# Patient Record
Sex: FEMALE | Race: WHITE | Employment: FULL TIME | URBAN - METROPOLITAN AREA
[De-identification: names, ages, dates, MRNs, and addresses within clinical notes are randomized per-mention and may not be internally consistent; named-entity substitution may affect disease eponyms.]

---

## 2017-03-22 ENCOUNTER — OFFICE VISIT (OUTPATIENT)
Dept: FAMILY MEDICINE CLINIC | Age: 37
End: 2017-03-22
Payer: COMMERCIAL

## 2017-03-22 VITALS
TEMPERATURE: 98.4 F | SYSTOLIC BLOOD PRESSURE: 143 MMHG | BODY MASS INDEX: 31.58 KG/M2 | OXYGEN SATURATION: 99 % | RESPIRATION RATE: 17 BRPM | DIASTOLIC BLOOD PRESSURE: 97 MMHG | HEART RATE: 90 BPM | WEIGHT: 185 LBS | HEIGHT: 64 IN

## 2017-03-22 DIAGNOSIS — R07.89 CHEST WALL PAIN: ICD-10-CM
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
## 2017-03-22 DIAGNOSIS — M25.512 ACUTE PAIN OF LEFT SHOULDER: ICD-10-CM

## 2017-03-22 PROCEDURE — 99203 OFFICE O/P NEW LOW 30 MIN: CPT | Performed by: NURSE PRACTITIONER

## 2017-03-22 ASSESSMENT — ENCOUNTER SYMPTOMS
CHEST TIGHTNESS: 0
COUGH: 0
DIARRHEA: 0
NAUSEA: 0
ABDOMINAL PAIN: 0
BLOOD IN STOOL: 0
VOMITING: 0
SHORTNESS OF BREATH: 0

## 2017-10-16 ENCOUNTER — OFFICE VISIT (OUTPATIENT)
Dept: FAMILY MEDICINE CLINIC | Age: 37
End: 2017-10-16
Payer: COMMERCIAL

## 2017-10-16 VITALS
RESPIRATION RATE: 16 BRPM | BODY MASS INDEX: 31.41 KG/M2 | DIASTOLIC BLOOD PRESSURE: 73 MMHG | TEMPERATURE: 98.2 F | HEART RATE: 95 BPM | SYSTOLIC BLOOD PRESSURE: 120 MMHG | OXYGEN SATURATION: 98 % | HEIGHT: 64 IN | WEIGHT: 184 LBS

## 2017-10-16 DIAGNOSIS — J01.90 ACUTE BACTERIAL SINUSITIS: Primary | ICD-10-CM

## 2017-10-16 DIAGNOSIS — B96.89 ACUTE BACTERIAL SINUSITIS: Primary | ICD-10-CM

## 2017-10-16 PROCEDURE — 99214 OFFICE O/P EST MOD 30 MIN: CPT | Performed by: NURSE PRACTITIONER

## 2017-10-16 RX ORDER — DOXYCYCLINE HYCLATE 100 MG
100 TABLET ORAL 2 TIMES DAILY
Qty: 20 TABLET | Refills: 0 | Status: SHIPPED | OUTPATIENT
Start: 2017-10-16 | End: 2017-10-26

## 2017-10-16 ASSESSMENT — ENCOUNTER SYMPTOMS
SORE THROAT: 0
COUGH: 1
CHEST TIGHTNESS: 0
EYE DISCHARGE: 0
RHINORRHEA: 1
SHORTNESS OF BREATH: 1
VOICE CHANGE: 0
SINUS PRESSURE: 1
WHEEZING: 1
EYE REDNESS: 0

## 2017-10-16 NOTE — PROGRESS NOTES
[address] [facility] [address]  Dept: [phone]  Dept Fax: [phone]    Sintia Shirley is a 44 y.o. female who presents to the urgent care today for her medical conditions/complaints as noted below. Sintia Shirley is c/o of Chest Congestion (cough x 2 weeks)    HPI:     Cough   This is a new problem. Episode onset: 2 weeks ago. The problem has been unchanged. The cough is productive of sputum. Associated symptoms include nasal congestion, rhinorrhea, shortness of breath (if talking too much) and wheezing. Pertinent negatives include no chest pain, chills, ear congestion, ear pain, eye redness, fever, headaches, myalgias, postnasal drip, rash or sore throat. Risk factors for lung disease include smoking/tobacco exposure (occasionally). Treatments tried: dayquil, nyquil, mucinex. The treatment provided no relief. There is no history of asthma or COPD. History reviewed. No pertinent past medical history. Current Outpatient Prescriptions   Medication Sig Dispense Refill    Norgestimate-Eth Estradiol (MONONESSA PO) Take by mouth daily      doxycycline hyclate (VIBRA-TABS) 100 MG tablet Take 1 tablet by mouth 2 times daily for 10 days 20 tablet 0     No current facility-administered medications for this visit. Allergies   Allergen Reactions    Penicillins Rash     Reviewed PMH, SH, and FH with the patient and updated. Subjective:      Review of Systems   Constitutional: Negative for chills, fatigue and fever. HENT: Positive for congestion, rhinorrhea and sinus pressure. Negative for ear discharge, ear pain, postnasal drip, sneezing, sore throat and voice change. Eyes: Negative for discharge and redness. Respiratory: Positive for cough, shortness of breath (if talking too much) and wheezing. Negative for chest tightness. Cardiovascular: Negative. Negative for chest pain.    Musculoskeletal: Negative for Dispense:  20 tablet     Refill:  0       Patient given educational materials - see patient instructions. Discussed use, benefit, and side effects of prescribed medications. All patient questions answered. Pt voiced understanding.     Electronically signed by Kendra Brown NP on 10/16/2017 at 12:17 PM

## 2017-10-16 NOTE — PATIENT INSTRUCTIONS
hot, wet towel or a warm gel pack on your face 3 or 4 times a day for 5 to 10 minutes each time. · Try a decongestant nasal spray like oxymetazoline (Afrin). Do not use it for more than 3 days in a row. Using it for more than 3 days can make your congestion worse. When should you call for help? Call your doctor now or seek immediate medical care if:  · You have new or worse swelling or redness in your face or around your eyes. · You have a new or higher fever. Watch closely for changes in your health, and be sure to contact your doctor if:  · You have new or worse facial pain. · The mucus from your nose becomes thicker (like pus) or has new blood in it. · You are not getting better as expected. Where can you learn more? Go to https://C$ cMoneypemosheMedCity Newseb.[employer]. org and sign in to your QuickoLabs account. Enter W568 in the Inversiones.com box to learn more about \"Sinusitis: Care Instructions. \"     If you do not have an account, please click on the \"Sign Up Now\" link. Current as of: July 29, 2016  Content Version: 11.3  © 0002-6687 [employer]oh, Skopeo.fr. Care instructions adapted under license by [facility] ([facility]). If you have questions about a medical condition or this instruction, always ask your healthcare professional. [address]  any warranty or liability for your use of this information.

## 2017-11-08 ENCOUNTER — OFFICE VISIT (OUTPATIENT)
Dept: FAMILY MEDICINE CLINIC | Age: 37
End: 2017-11-08
Payer: COMMERCIAL

## 2017-11-08 VITALS
TEMPERATURE: 97.7 F | SYSTOLIC BLOOD PRESSURE: 112 MMHG | HEIGHT: 64 IN | OXYGEN SATURATION: 98 % | DIASTOLIC BLOOD PRESSURE: 78 MMHG | WEIGHT: 191 LBS | BODY MASS INDEX: 32.61 KG/M2 | HEART RATE: 81 BPM

## 2017-11-08 DIAGNOSIS — J01.90 ACUTE BACTERIAL SINUSITIS: Primary | ICD-10-CM

## 2017-11-08 DIAGNOSIS — B96.89 ACUTE BACTERIAL SINUSITIS: Primary | ICD-10-CM

## 2017-11-08 PROCEDURE — 99214 OFFICE O/P EST MOD 30 MIN: CPT | Performed by: NURSE PRACTITIONER

## 2017-11-08 RX ORDER — CLINDAMYCIN HYDROCHLORIDE 300 MG/1
300 CAPSULE ORAL 3 TIMES DAILY
Qty: 30 CAPSULE | Refills: 0 | Status: SHIPPED | OUTPATIENT
Start: 2017-11-08 | End: 2017-11-18

## 2017-11-08 RX ORDER — FLUTICASONE PROPIONATE 50 MCG
2 SPRAY, SUSPENSION (ML) NASAL DAILY
Qty: 1 BOTTLE | Refills: 0 | Status: SHIPPED | OUTPATIENT
Start: 2017-11-08 | End: 2018-01-02 | Stop reason: SDUPTHER

## 2017-11-08 ASSESSMENT — ENCOUNTER SYMPTOMS
WHEEZING: 0
SHORTNESS OF BREATH: 1
CHEST TIGHTNESS: 0
VOICE CHANGE: 0
SORE THROAT: 1
RHINORRHEA: 1
EYE DISCHARGE: 0
EYE REDNESS: 0
SINUS PRESSURE: 1
COUGH: 1

## 2017-11-08 NOTE — PATIENT INSTRUCTIONS

## 2017-11-08 NOTE — PROGRESS NOTES
[facility]  [address] [place] [address]  Dept: [phone]  Dept Fax: [phone]    Shaka Beard is a 44 y.o. female who presents to the urgent care today for her medical conditions/complaints as noted below. Shaka Beard is c/o of Facial Pain (pressure, cough, sore throat, feels like she has a pill stuck in throat  )    HPI:     Sinusitis   This is a new problem. Episode onset: Sunday. The problem has been gradually worsening since onset. There has been no fever. Associated symptoms include congestion, coughing (productive purulent), ear pain (congestion), shortness of breath (at times), sinus pressure and a sore throat (scatchy). Pertinent negatives include no chills, headaches or sneezing. Treatments tried: dayquil, nyquil, theraflu. The treatment provided no relief. Was on Augmentin about 3 weeks ago and got better with the treatment. Cough never got better though. Symptoms are worse over the last 3-4 days. History reviewed. No pertinent past medical history. Current Outpatient Prescriptions   Medication Sig Dispense Refill    metFORMIN (GLUCOPHAGE) 500 MG tablet Take 500 mg by mouth daily      clindamycin (CLEOCIN) 300 MG capsule Take 1 capsule by mouth 3 times daily for 10 days 30 capsule 0    fluticasone (FLONASE) 50 MCG/ACT nasal spray 2 sprays by Nasal route daily 1 Bottle 0    Norgestimate-Eth Estradiol (MONONESSA PO) Take by mouth daily       No current facility-administered medications for this visit. Allergies   Allergen Reactions    Penicillins Rash     Reviewed PMH, SH, and FH with the patient and updated. Subjective:      Review of Systems   Constitutional: Negative for chills, fatigue and fever. HENT: Positive for congestion, ear pain (congestion), postnasal drip, rhinorrhea, sinus pressure and sore throat (scatchy). Negative for ear discharge, sneezing and voice change.     Eyes: Negative for discharge and redness. Respiratory: Positive for cough (productive purulent) and shortness of breath (at times). Negative for chest tightness and wheezing. Cardiovascular: Negative. Negative for chest pain. Musculoskeletal: Negative for myalgias. Skin: Negative for rash. Neurological: Negative for dizziness, weakness, light-headedness and headaches. Hematological: Negative for adenopathy. All other systems reviewed and are negative. Objective:     Physical Exam   Constitutional: She appears well-developed and well-nourished. HENT:   Head: Normocephalic. Right Ear: Tympanic membrane and external ear normal.   Left Ear: Tympanic membrane and external ear normal.   Nose: Rhinorrhea present. Right sinus exhibits maxillary sinus tenderness. Right sinus exhibits no frontal sinus tenderness. Left sinus exhibits maxillary sinus tenderness. Left sinus exhibits no frontal sinus tenderness. Mouth/Throat: Oropharyngeal exudate (PND) and posterior oropharyngeal erythema (slight) present. Eyes: Right eye exhibits no discharge. Left eye exhibits no discharge. Cardiovascular: Normal rate, regular rhythm and normal heart sounds. No murmur heard. Pulmonary/Chest: Effort normal and breath sounds normal. No respiratory distress. She has no wheezes. Lymphadenopathy:     She has cervical adenopathy. Skin: Skin is warm. No rash noted. She is not diaphoretic. Nursing note and vitals reviewed. /78 (Site: Left Arm, Position: Sitting, Cuff Size: Medium Adult)   Pulse 81   Temp 97.7 °F (36.5 °C) (Oral)   Ht 5' 3.5\" (1.613 m)   Wt 191 lb (86.6 kg)   LMP 11/01/2017 (Exact Date)   SpO2 98%   BMI 33.30 kg/m²     Assessment:      1. Acute bacterial sinusitis  clindamycin (CLEOCIN) 300 MG capsule    fluticasone (FLONASE) 50 MCG/ACT nasal spray     Plan:      Based on the severity of the symptoms-- I will treat this as bacterial at this time with Clindamycin x 10 days.   Patient instructed to complete antibiotic prescription fully. Flonase daily. May use Motrin/Tylenol for fever/pain. Saline washes, salt water gargles and over the counter preparations if desired. Follow up if symptoms do not improve. Orders Placed This Encounter   Medications    clindamycin (CLEOCIN) 300 MG capsule     Sig: Take 1 capsule by mouth 3 times daily for 10 days     Dispense:  30 capsule     Refill:  0    fluticasone (FLONASE) 50 MCG/ACT nasal spray     Si sprays by Nasal route daily     Dispense:  1 Bottle     Refill:  0       Patient given educational materials - see patient instructions. Discussed use, benefit, and side effects of prescribed medications. All patient questions answered. Pt voiced understanding.     Electronically signed by Zheng Harrison NP on 2017 at 5:52 PM

## 2018-01-02 DIAGNOSIS — J01.90 ACUTE BACTERIAL SINUSITIS: ICD-10-CM

## 2018-01-02 DIAGNOSIS — B96.89 ACUTE BACTERIAL SINUSITIS: ICD-10-CM

## 2018-01-02 RX ORDER — FLUTICASONE PROPIONATE 50 MCG
2 SPRAY, SUSPENSION (ML) NASAL DAILY
Qty: 1 BOTTLE | Refills: 0 | Status: SHIPPED | OUTPATIENT
Start: 2018-01-02 | End: 2019-09-20

## 2018-03-29 ENCOUNTER — HOSPITAL ENCOUNTER (EMERGENCY)
Age: 38
Discharge: HOME | End: 2018-03-30
Attending: EMERGENCY MEDICINE
Payer: COMMERCIAL

## 2018-03-29 ENCOUNTER — APPOINTMENT (OUTPATIENT)
Dept: GENERAL RADIOLOGY | Age: 38
End: 2018-03-29
Payer: COMMERCIAL

## 2018-03-29 VITALS
WEIGHT: 190 LBS | BODY MASS INDEX: 32.44 KG/M2 | RESPIRATION RATE: 18 BRPM | TEMPERATURE: 97.7 F | HEART RATE: 101 BPM | SYSTOLIC BLOOD PRESSURE: 137 MMHG | OXYGEN SATURATION: 98 % | DIASTOLIC BLOOD PRESSURE: 84 MMHG | HEIGHT: 64 IN

## 2018-03-29 DIAGNOSIS — R07.89 CHEST WALL PAIN: Primary | ICD-10-CM

## 2018-03-29 LAB
CHP ED QC CHECK: NORMAL
EKG ATRIAL RATE: 93 BPM
EKG P AXIS: 59 DEGREES
EKG P-R INTERVAL: 158 MS
EKG Q-T INTERVAL: 442 MS
EKG QRS DURATION: 86 MS
EKG QTC CALCULATION (BAZETT): 549 MS
EKG R AXIS: 22 DEGREES
EKG T AXIS: 36 DEGREES
EKG VENTRICULAR RATE: 93 BPM
PREGNANCY TEST URINE, POC: NEGATIVE

## 2018-03-29 PROCEDURE — 71046 X-RAY EXAM CHEST 2 VIEWS: CPT

## 2018-03-29 PROCEDURE — 93005 ELECTROCARDIOGRAM TRACING: CPT

## 2018-03-29 PROCEDURE — 84703 CHORIONIC GONADOTROPIN ASSAY: CPT

## 2018-03-29 PROCEDURE — 99285 EMERGENCY DEPT VISIT HI MDM: CPT

## 2018-03-29 RX ORDER — METAXALONE 800 MG/1
800 TABLET ORAL ONCE
Status: COMPLETED | OUTPATIENT
Start: 2018-03-29 | End: 2018-03-30

## 2018-03-29 RX ORDER — HYDROCODONE BITARTRATE AND ACETAMINOPHEN 5; 325 MG/1; MG/1
2 TABLET ORAL ONCE
Status: COMPLETED | OUTPATIENT
Start: 2018-03-29 | End: 2018-03-30

## 2018-03-29 ASSESSMENT — PAIN DESCRIPTION - PROGRESSION: CLINICAL_PROGRESSION: GRADUALLY WORSENING

## 2018-03-29 ASSESSMENT — PAIN DESCRIPTION - FREQUENCY: FREQUENCY: CONTINUOUS

## 2018-03-29 ASSESSMENT — PAIN DESCRIPTION - ORIENTATION: ORIENTATION: MID

## 2018-03-29 ASSESSMENT — PAIN DESCRIPTION - DESCRIPTORS: DESCRIPTORS: DISCOMFORT

## 2018-03-29 ASSESSMENT — PAIN DESCRIPTION - PAIN TYPE: TYPE: ACUTE PAIN

## 2018-03-29 ASSESSMENT — PAIN DESCRIPTION - LOCATION: LOCATION: CHEST

## 2018-03-29 ASSESSMENT — PAIN SCALES - GENERAL: PAINLEVEL_OUTOF10: 10

## 2018-03-30 LAB — HCG, PREGNANCY URINE (POC): NEGATIVE

## 2018-03-30 PROCEDURE — 6370000000 HC RX 637 (ALT 250 FOR IP): Performed by: NURSE PRACTITIONER

## 2018-03-30 RX ORDER — METHOCARBAMOL 750 MG/1
750 TABLET, FILM COATED ORAL 3 TIMES DAILY PRN
Qty: 15 TABLET | Refills: 0 | Status: SHIPPED | OUTPATIENT
Start: 2018-03-30 | End: 2018-04-04

## 2018-03-30 RX ORDER — IBUPROFEN 800 MG/1
800 TABLET ORAL EVERY 8 HOURS PRN
Qty: 20 TABLET | Refills: 0 | Status: SHIPPED | OUTPATIENT
Start: 2018-03-30 | End: 2019-09-20

## 2018-03-30 RX ADMIN — METAXALONE 800 MG: 800 TABLET ORAL at 00:28

## 2018-03-30 RX ADMIN — HYDROCODONE BITARTRATE AND ACETAMINOPHEN 2 TABLET: 5; 325 TABLET ORAL at 00:28

## 2018-03-30 NOTE — ED PROVIDER NOTES
[address] [place] [address] ED  eMERGENCY dEPARTMENT eNCOUnter      Pt Name: Helio Desai  MRN: [id number]  [place] 1980  Date of evaluation: 3/29/2018  Provider: Kerry Broussard NP    [address] [place] [address]       Chief Complaint   Patient presents with    Chest Pain     mid sternum         HISTORY OF PRESENT ILLNESS  (Location/Symptom, Timing/Onset, Context/Setting, Quality, Duration, Modifying Factors, Severity.)   Helio Desai is a 44 y.o. female who presents to the emergency department By private auto for evaluation of midsternal chest pain that occurred on patient slipped and went to fall backwards and she put her arms out to brace her fall and felt a pull in the middle of her chest.  She denies other injury. Pain is worse with movement. Pain is a 10 out of 10. Nursing Notes were reviewed. PAST MEDICAL HISTORY     Past Medical History:   Diagnosis Date    Diabetes mellitus ([facility] [address]     type 2         SURGICAL HISTORY     History reviewed. No pertinent surgical history.       CURRENT MEDICATIONS       Previous Medications    FLUTICASONE (FLONASE) 50 MCG/ACT NASAL SPRAY    2 sprays by Nasal route daily    METFORMIN (GLUCOPHAGE) 500 MG TABLET    Take 500 mg by mouth daily    NORGESTIMATE-ETH ESTRADIOL (MONONESSA PO)    Take by mouth daily       ALLERGIES     Penicillins    FAMILY HISTORY       Family History   Problem Relation Age of Onset    Heart Disease Mother     Other Mother      blood clotting disorder    Heart Disease Father     Other Sister      ASD          SOCIAL HISTORY       Social History     Social History    Marital status:      Spouse name: N/A    Number of children: N/A    Years of education: N/A     Social History Main Topics    Smoking status: Current Every Day Smoker     Packs/day: 0.50     Types: Cigarettes    Smokeless tobacco: Former User      Comment: 4 per day    Alcohol use No      Comment: socially    Drug use: No    Sexual activity: Not Currently     Other Topics Concern   

## 2018-03-30 NOTE — ED NOTES
Pt presents to ED via private auto with reports of mid sternum chest discomfort 10/10. Pt states she slipped and heard a \"pop\" in her chest. Pt denies taking anything for the discomfort prior to arrival. Pt denies any cardiac hx. Respirations even, non labored. Skin warm, dry, intact. Pt states that the discomfort is worse with breathing. Pt oxygen saturation 98 % on room air. Family at bedside. Call light within reach. Will continue to monitor for improvement.       Prosper Thorpe RN  03/29/18 1219

## 2019-09-20 ENCOUNTER — OFFICE VISIT (OUTPATIENT)
Dept: FAMILY MEDICINE CLINIC | Age: 39
End: 2019-09-20
Payer: COMMERCIAL

## 2019-09-20 VITALS
BODY MASS INDEX: 32.27 KG/M2 | SYSTOLIC BLOOD PRESSURE: 131 MMHG | HEIGHT: 64 IN | TEMPERATURE: 98.4 F | DIASTOLIC BLOOD PRESSURE: 82 MMHG | WEIGHT: 189 LBS | HEART RATE: 77 BPM | OXYGEN SATURATION: 97 %

## 2019-09-20 DIAGNOSIS — M79.10 MUSCLE PAIN: ICD-10-CM

## 2019-09-20 DIAGNOSIS — M54.9 UPPER BACK PAIN: Primary | ICD-10-CM

## 2019-09-20 PROCEDURE — 99213 OFFICE O/P EST LOW 20 MIN: CPT | Performed by: NURSE PRACTITIONER

## 2019-09-20 PROCEDURE — 96372 THER/PROPH/DIAG INJ SC/IM: CPT | Performed by: NURSE PRACTITIONER

## 2019-09-20 RX ORDER — CYCLOBENZAPRINE HCL 5 MG
5 TABLET ORAL 2 TIMES DAILY PRN
Qty: 10 TABLET | Refills: 0 | Status: SHIPPED | OUTPATIENT
Start: 2019-09-20 | End: 2019-09-30

## 2019-09-20 RX ORDER — KETOROLAC TROMETHAMINE 30 MG/ML
30 INJECTION, SOLUTION INTRAMUSCULAR; INTRAVENOUS ONCE
Status: COMPLETED | OUTPATIENT
Start: 2019-09-20 | End: 2019-09-20

## 2019-09-20 RX ORDER — TRIAMCINOLONE ACETONIDE 40 MG/ML
40 INJECTION, SUSPENSION INTRA-ARTICULAR; INTRAMUSCULAR ONCE
Status: COMPLETED | OUTPATIENT
Start: 2019-09-20 | End: 2019-09-20

## 2019-09-20 RX ORDER — NORGESTIMATE AND ETHINYL ESTRADIOL 0.25-0.035
KIT ORAL
Refills: 12 | COMMUNITY
Start: 2019-09-06 | End: 2021-06-17

## 2019-09-20 RX ADMIN — KETOROLAC TROMETHAMINE 30 MG: 30 INJECTION, SOLUTION INTRAMUSCULAR; INTRAVENOUS at 15:35

## 2019-09-20 RX ADMIN — TRIAMCINOLONE ACETONIDE 40 MG: 40 INJECTION, SUSPENSION INTRA-ARTICULAR; INTRAMUSCULAR at 15:35

## 2019-09-20 ASSESSMENT — ENCOUNTER SYMPTOMS
NAUSEA: 0
COUGH: 0
COLOR CHANGE: 0
RHINORRHEA: 0
SHORTNESS OF BREATH: 0
CHEST TIGHTNESS: 0
SORE THROAT: 0
VOMITING: 0
BACK PAIN: 1

## 2019-09-20 NOTE — PATIENT INSTRUCTIONS
and exercise. · Take pain medicines exactly as directed. ? If the doctor gave you a prescription medicine for pain, take it as prescribed. ? If you are not taking a prescription pain medicine, ask your doctor if you can take an over-the-counter medicine. When should you call for help? Call 911 anytime you think you may need emergency care. For example, call if:    · You are unable to move a leg at all.     · You have back pain with severe belly pain.     · You have symptoms of a heart attack. These may include:  ? Chest pain or pressure, or a strange feeling in the chest.  ? Sweating. ? Shortness of breath. ? Nausea or vomiting. ? Pain, pressure, or a strange feeling in the back, neck, jaw, or upper belly or in one or both shoulders or arms. ? Lightheadedness or sudden weakness. ? A fast or irregular heartbeat. After you call 911, the  may tell you to chew 1 adult-strength or 2 to 4 low-dose aspirin. Wait for an ambulance. Do not try to drive yourself.    Call your doctor now or seek immediate medical care if:    · You have new or worse symptoms in your arms, legs, chest, belly, or buttocks. Symptoms may include:  ? Numbness or tingling. ? Weakness. ? Pain.     · You lose bladder or bowel control.     · You have back pain and:  ? You have injured your back while lifting or doing some other activity. Call if the pain is severe, has not gone away after 1 or 2 days, and you cannot do your normal daily activities. ? You have had a back injury before that needed treatment. ? Your pain has lasted longer than 4 weeks. ? You have had weight loss you cannot explain. ? You have a fever. ? You are age 48 or older. ? You have cancer now or have had it before.    Watch closely for changes in your health, and be sure to contact your doctor if you are not getting better as expected. Where can you learn more? Go to https://nhan.NovaSparks. org and sign in to your MobileOCT account.  Enter T181 in the [facility] box to learn more about \"Back Pain, Emergency or Urgent Symptoms: Care Instructions. \"     If you do not have an account, please click on the \"Sign Up Now\" link. Current as of: September 23, 2018  Content Version: 12.1  © 8450-6780 Healthwise, Incorporated. Care instructions adapted under license by [facility] ([facility]). If you have questions about a medical condition or this instruction, always ask your healthcare professional. [address] any warranty or liability for your use of this information.

## 2020-07-16 ENCOUNTER — OFFICE VISIT (OUTPATIENT)
Dept: FAMILY MEDICINE CLINIC | Age: 40
End: 2020-07-16
Payer: COMMERCIAL

## 2020-07-16 VITALS
HEART RATE: 85 BPM | DIASTOLIC BLOOD PRESSURE: 76 MMHG | SYSTOLIC BLOOD PRESSURE: 115 MMHG | OXYGEN SATURATION: 97 % | TEMPERATURE: 98.1 F

## 2020-07-16 PROCEDURE — 99213 OFFICE O/P EST LOW 20 MIN: CPT | Performed by: PHYSICIAN ASSISTANT

## 2020-07-16 RX ORDER — SULFAMETHOXAZOLE AND TRIMETHOPRIM 800; 160 MG/1; MG/1
1 TABLET ORAL 2 TIMES DAILY
Qty: 14 TABLET | Refills: 0 | Status: SHIPPED | OUTPATIENT
Start: 2020-07-16 | End: 2020-07-23

## 2020-07-16 ASSESSMENT — ENCOUNTER SYMPTOMS
RESPIRATORY NEGATIVE: 1
BURN: 1
GASTROINTESTINAL NEGATIVE: 1

## 2020-07-16 ASSESSMENT — PATIENT HEALTH QUESTIONNAIRE - PHQ9
SUM OF ALL RESPONSES TO PHQ9 QUESTIONS 1 & 2: 0
SUM OF ALL RESPONSES TO PHQ QUESTIONS 1-9: 0
1. LITTLE INTEREST OR PLEASURE IN DOING THINGS: 0
2. FEELING DOWN, DEPRESSED OR HOPELESS: 0
SUM OF ALL RESPONSES TO PHQ QUESTIONS 1-9: 0
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
## 2020-07-16 NOTE — PATIENT INSTRUCTIONS
Patient Education        Faustin: Care Instructions  Your Care Instructions     Faustin--even minor ones--can be very painful. A minor burn may heal within several days, while a more serious burn may take weeks or even months to heal completely. You may notice that the burned area feels tight and hard while it is healing. It is important to continue to move the area as the burn heals to prevent loss of motion or loss of function in the area. When your skin is damaged by a burn, you have a greater risk of infection. Keep the wound clean and change the bandages regularly to prevent infection and help the burn heal.  Burns can leave permanent scars. Taking good care of the burn as it heals may help prevent bad scars. The doctor has checked you carefully, but problems can develop later. If you notice any problems or new symptoms, get medical treatment right away. Follow-up care is a key part of your treatment and safety. Be sure to make and go to all appointments, and call your doctor if you are having problems. It's also a good idea to know your test results and keep a list of the medicines you take. How can you care for yourself at home? · If your doctor told you how to care for your burn, follow your doctor's instructions. If you did not get instructions, follow this general advice:  ? Wash the burn with clean water 2 times a day. Don't use hydrogen peroxide or alcohol, which can slow healing. ? Gently pat the burn dry after you wash it.  ? You may cover the burn with a thin layer of petroleum jelly, such as Vaseline, and a nonstick bandage. ? Apply more petroleum jelly and replace the bandage as needed. · Protect your burn while it is healing. Cover your burn if you are going out in the cold or the sun. ? Wear long sleeves if the burn is on your hands or arms. ? Wear a hat if the burn is on your face. ? Wear socks and shoes if the burn is on your feet. · Do not break blisters open.  This increases the chance of infection. If a blister breaks open by itself, blot up the liquid, and leave the skin that covered the blister. This helps protect the new skin. · If your doctor prescribed antibiotics, take them as directed. Do not stop taking them just because you feel better. You need to take the full course of antibiotics. For pain and itching  · Take pain medicines exactly as directed. ? If the doctor gave you a prescription medicine for pain, take it as prescribed. ? If you are not taking a prescription pain medicine, ask your doctor if you can take an over-the-counter medicine. · If the burn itches, try not to scratch it. Try an over-the-counter antihistamine such as diphenhydramine (Benadryl) or loratadine (Claritin). Read and follow all instructions on the label. When should you call for help? Call your doctor now or seek immediate medical care if:  · Your pain gets worse. · You have symptoms of infection, such as:  ? Increased pain, swelling, warmth, or redness near the burn. ? Red streaks leading from the burn. ? Pus draining from the burn. ? A fever. Watch closely for changes in your health, and be sure to contact your doctor if:  · You do not get better as expected. Where can you learn more? Go to https://Green A.Mirifice. org and sign in to your InVenture account. Enter B790 in the [facility] box to learn more about \"Burns: Care Instructions. \"     If you do not have an account, please click on the \"Sign Up Now\" link. Current as of: June 26, 2019               Content Version: 12.5  © 5509-3952 Healthwise, Incorporated. Care instructions adapted under license by [facility] ([facility]). If you have questions about a medical condition or this instruction, always ask your healthcare professional. [place] [address] any warranty or liability for your use of this information.          Patient Education

## 2020-08-14 ENCOUNTER — OFFICE VISIT (OUTPATIENT)
Dept: FAMILY MEDICINE CLINIC | Age: 40
End: 2020-08-14
Payer: COMMERCIAL

## 2020-08-14 ENCOUNTER — HOSPITAL ENCOUNTER (OUTPATIENT)
Age: 40
Setting detail: SPECIMEN
Discharge: HOME | End: 2020-08-14
Payer: COMMERCIAL

## 2020-08-14 VITALS
RESPIRATION RATE: 16 BRPM | TEMPERATURE: 97.9 F | BODY MASS INDEX: 32.44 KG/M2 | HEIGHT: 64 IN | HEART RATE: 92 BPM | OXYGEN SATURATION: 97 % | WEIGHT: 190 LBS

## 2020-08-14 PROCEDURE — 99202 OFFICE O/P NEW SF 15 MIN: CPT | Performed by: NURSE PRACTITIONER

## 2020-08-14 RX ORDER — BENZONATATE 200 MG/1
200 CAPSULE ORAL 3 TIMES DAILY PRN
Qty: 30 CAPSULE | Refills: 0 | Status: SHIPPED | OUTPATIENT
Start: 2020-08-14 | End: 2020-08-21

## 2020-08-14 ASSESSMENT — PATIENT HEALTH QUESTIONNAIRE - PHQ9
SUM OF ALL RESPONSES TO PHQ QUESTIONS 1-9: 0
SUM OF ALL RESPONSES TO PHQ9 QUESTIONS 1 & 2: 0
SUM OF ALL RESPONSES TO PHQ QUESTIONS 1-9: 0
1. LITTLE INTEREST OR PLEASURE IN DOING THINGS: 0
2. FEELING DOWN, DEPRESSED OR HOPELESS: 0

## 2020-08-14 ASSESSMENT — ENCOUNTER SYMPTOMS
SORE THROAT: 1
SINUS PAIN: 0
DIARRHEA: 0
NAUSEA: 0
COUGH: 1
SHORTNESS OF BREATH: 0
ABDOMINAL PAIN: 0
VOMITING: 0

## 2020-08-14 NOTE — LETTER
[address] [place] [address] [address] [address]  Phone: [phone]  Fax: [phone]    CLARA Avila CNP        August 14, 2020     Patient: Justin Oro   YOB: 1980   Date of Visit: 8/14/2020       To Whom It May Concern: It is my medical opinion that Justin Oro is excused pending COVID results. If you have any questions or concerns, please don't hesitate to call.     Sincerely,        CLARA Avila CNP

## 2020-08-14 NOTE — PROGRESS NOTES
[facility]  [address]  [facility] [address]  Dept: [phone]  Dept Fax: [phone]    Chantell Yousif is a 35 y.o. female who presents to the urgent care today for her medicalconditions/complaints as noted below. Chantell Yousif is c/o of Cough; Congestion; and Nasal Congestion      HPI:     58-year-old female patient presents with complaint of cough, congestion, mild sore throat and postnasal drainage. Reports abrupt onset of symptoms yesterday. Reports cough is intermittent, mostly dry. Reports nasal congestion productive of yellow mucus. Reports mild scratchy sore throat likely caused by postnasal drainage per the patient. Denies sinus pain or pressure. Denies any ear pain. Denies fevers or chills. Denies chest pain or shortness of breath. Denies vomiting or diarrhea. Denies loss of taste or smell. Denies any COVID-19 exposure. .      Past Medical History:   Diagnosis Date    Diabetes mellitus (HCC)     type 2        Current Outpatient Medications   Medication Sig Dispense Refill    benzonatate (TESSALON) 200 MG capsule Take 1 capsule by mouth 3 times daily as needed for Cough 30 capsule 0    silver sulfADIAZINE (SILVADENE) 1 % cream Apply 2 time daily 25 g 1    ESTARYLLA 0.25-35 MG-MCG per tablet TK 1 T PO D  12    metFORMIN (GLUCOPHAGE) 500 MG tablet Take 500 mg by mouth daily       No current facility-administered medications for this visit. Allergies   Allergen Reactions    Penicillins Rash       Subjective:      Review of Systems   Constitutional: Negative for chills and fever. HENT: Positive for congestion and sore throat. Negative for ear pain and sinus pain. Respiratory: Positive for cough. Negative for shortness of breath. Cardiovascular: Negative for chest pain and palpitations. Gastrointestinal: Negative for abdominal pain, diarrhea, nausea and vomiting.    Neurological: Negative for dizziness and findings. Tylenol/Motrin for fever/discomfort. Patient agreeable to treatment plan. Educational materials provided on AVS.  Follow up if symptoms do not improve/worsen. Recommend isolation pending covid results. Discussed treatment regimen to include rest, hydration, tylenol prn. Discussed deep breathing exercises. Discussed to monitor for progression of symptoms. Follow up as needed. Will contact patient for results       Patient given educational materials - see patient instructions. Discussed use, benefit, and side effects of prescribed medications. All patientquestions answered. Pt voiced understanding. This note was transcribed using dictation with Dragon services. Efforts were made to correct any errors but some words may be misinterpreted.      Electronically signed by CLARA Strauss Che, CNP on 8/14/2020at 9:10 AM

## 2020-08-18 ENCOUNTER — TELEPHONE (OUTPATIENT)
Dept: FAMILY MEDICINE CLINIC | Age: 40
End: 2020-08-18

## 2020-08-18 LAB — SARS-COV-2, NAA: NOT DETECTED

## 2020-08-18 RX ORDER — AZITHROMYCIN 250 MG/1
250 TABLET, FILM COATED ORAL SEE ADMIN INSTRUCTIONS
Qty: 6 TABLET | Refills: 0 | Status: SHIPPED | OUTPATIENT
Start: 2020-08-18 | End: 2020-08-23

## 2020-08-18 NOTE — TELEPHONE ENCOUNTER
Patient called stating that she is not getting better and was told that if her COVID-19 test came back negative [facility] would send an antibiotic. Patient states that she would like that antibiotic sent to her pharmacy because her symptoms have not improved since her visit.      Thank you

## 2020-08-25 ENCOUNTER — TELEPHONE (OUTPATIENT)
Dept: FAMILY MEDICINE CLINIC | Age: 40
End: 2020-08-25

## 2020-08-25 ENCOUNTER — HOSPITAL ENCOUNTER (OUTPATIENT)
Age: 40
Discharge: HOME | End: 2020-08-27
Payer: COMMERCIAL

## 2020-08-25 ENCOUNTER — HOSPITAL ENCOUNTER (OUTPATIENT)
Dept: GENERAL RADIOLOGY | Age: 40
Discharge: HOME | End: 2020-08-27
Payer: COMMERCIAL

## 2020-08-25 PROCEDURE — 71046 X-RAY EXAM CHEST 2 VIEWS: CPT

## 2020-08-25 RX ORDER — ALBUTEROL SULFATE 90 UG/1
2 AEROSOL, METERED RESPIRATORY (INHALATION) EVERY 6 HOURS PRN
Qty: 1 INHALER | Refills: 0 | Status: SHIPPED | OUTPATIENT
Start: 2020-08-25 | End: 2021-06-17

## 2020-08-25 RX ORDER — AZITHROMYCIN 250 MG/1
250 TABLET, FILM COATED ORAL SEE ADMIN INSTRUCTIONS
Qty: 6 TABLET | Refills: 0 | Status: SHIPPED | OUTPATIENT
Start: 2020-08-25 | End: 2020-08-25

## 2020-08-25 RX ORDER — DOXYCYCLINE HYCLATE 100 MG
100 TABLET ORAL 2 TIMES DAILY
Qty: 14 TABLET | Refills: 0 | Status: SHIPPED | OUTPATIENT
Start: 2020-08-25 | End: 2020-09-01

## 2020-08-25 RX ORDER — GUAIFENESIN AND CODEINE PHOSPHATE 100; 10 MG/5ML; MG/5ML
5 SOLUTION ORAL 3 TIMES DAILY PRN
Qty: 120 ML | Refills: 0 | Status: SHIPPED | OUTPATIENT
Start: 2020-08-25 | End: 2020-08-30

## 2020-08-25 NOTE — TELEPHONE ENCOUNTER
Spoke with pt regarding sx will send in doxy, zithromax completed, start inhaler, cough med.     Encouraged to complete d dimer    Discussed neg chest xray

## 2020-08-25 NOTE — TELEPHONE ENCOUNTER
Patient was seen on 8/14/20 and was given medication for her symptoms. Patient states that she finished the medication and is still not feeling better. Still having cough and sob. Please advise       Order for chest xray and dimer placed. Will treat with abx and inhaler given lack of improvement from supportive care.      Will f/u on results

## 2021-03-10 ENCOUNTER — HOSPITAL ENCOUNTER (OUTPATIENT)
Age: 41
Setting detail: SPECIMEN
Discharge: HOME | End: 2021-03-10
Payer: COMMERCIAL

## 2021-03-10 ENCOUNTER — OFFICE VISIT (OUTPATIENT)
Dept: PRIMARY CARE CLINIC | Age: 41
End: 2021-03-10
Payer: COMMERCIAL

## 2021-03-10 VITALS
WEIGHT: 190 LBS | HEIGHT: 64 IN | TEMPERATURE: 97.5 F | OXYGEN SATURATION: 98 % | BODY MASS INDEX: 32.44 KG/M2 | HEART RATE: 94 BPM

## 2021-03-10 DIAGNOSIS — J01.90 ACUTE BACTERIAL SINUSITIS: Primary | ICD-10-CM

## 2021-03-10 DIAGNOSIS — B96.89 ACUTE BACTERIAL SINUSITIS: Primary | ICD-10-CM

## 2021-03-10 DIAGNOSIS — R09.81 SINUS CONGESTION: ICD-10-CM

## 2021-03-10 PROCEDURE — 99213 OFFICE O/P EST LOW 20 MIN: CPT | Performed by: NURSE PRACTITIONER

## 2021-03-10 RX ORDER — DOXYCYCLINE HYCLATE 100 MG/1
100 CAPSULE ORAL 2 TIMES DAILY
Qty: 20 CAPSULE | Refills: 0 | Status: SHIPPED | OUTPATIENT
Start: 2021-03-10 | End: 2021-03-20

## 2021-03-10 ASSESSMENT — ENCOUNTER SYMPTOMS
SORE THROAT: 1
SINUS PRESSURE: 0
COUGH: 0
EYE REDNESS: 0
EYE DISCHARGE: 0
WHEEZING: 0
VOICE CHANGE: 0
CHEST TIGHTNESS: 0
SHORTNESS OF BREATH: 0

## 2021-03-10 ASSESSMENT — PATIENT HEALTH QUESTIONNAIRE - PHQ9
SUM OF ALL RESPONSES TO PHQ QUESTIONS 1-9: 0
SUM OF ALL RESPONSES TO PHQ QUESTIONS 1-9: 0

## 2021-03-10 NOTE — PROGRESS NOTES
[facility] [address] [facility] WALK IN CARE  [address] [address]  Dept: [phone]  Dept Fax: [phone]     Michael Oliveira is a 38 y.o. female who presents to the urgent care today for her medicalconditions/complaints as noted below. Michael Oliveira is c/o of Covid Testing (pt has been having headache cough swelling and congestion in her nose and ear fullness X 6 days  )    HPI:      Sinusitis  This is a new problem. Episode onset: 6 days ago. Associated symptoms include congestion, ear pain (fullness), headaches and a sore throat. Pertinent negatives include no chills, coughing, shortness of breath, sinus pressure or sneezing. Past Medical History:   Diagnosis Date    Diabetes mellitus (HCC)     type 2      Current Outpatient Medications   Medication Sig Dispense Refill    doxycycline hyclate (VIBRAMYCIN) 100 MG capsule Take 1 capsule by mouth 2 times daily for 10 days 20 capsule 0    albuterol sulfate  (90 Base) MCG/ACT inhaler Inhale 2 puffs into the lungs every 6 hours as needed for Wheezing 1 Inhaler 0    silver sulfADIAZINE (SILVADENE) 1 % cream Apply 2 time daily 25 g 1    ESTARYLLA 0.25-35 MG-MCG per tablet TK 1 T PO D  12    metFORMIN (GLUCOPHAGE) 500 MG tablet Take 500 mg by mouth daily       No current facility-administered medications for this visit. Allergies   Allergen Reactions    Penicillins Rash     Reviewed PMH, SH, and FH with the patient and updated. Subjective:      Review of Systems   Constitutional: Negative for chills, fatigue and fever. HENT: Positive for congestion, ear pain (fullness) and sore throat. Negative for ear discharge, postnasal drip, sinus pressure, sneezing and voice change. Eyes: Negative for discharge and redness. Respiratory: Negative for cough, chest tightness, shortness of breath and wheezing. Cardiovascular: Negative. Negative for chest pain. Musculoskeletal: Negative for myalgias.    Skin: Negative for rash.   Neurological: Positive for headaches. Negative for dizziness, weakness and light-headedness. Hematological: Negative for adenopathy. All other systems reviewed and are negative. Objective:      Physical Exam  Vitals signs and nursing note reviewed. Constitutional:       General: She is not in acute distress. Appearance: Normal appearance. She is well-developed. She is not ill-appearing, toxic-appearing or diaphoretic. HENT:      Head: Normocephalic. Right Ear: Tympanic membrane and external ear normal.      Left Ear: Tympanic membrane and external ear normal.      Nose: Nose normal.      Right Sinus: No maxillary sinus tenderness or frontal sinus tenderness. Left Sinus: No maxillary sinus tenderness or frontal sinus tenderness. Mouth/Throat:      Pharynx: No oropharyngeal exudate or posterior oropharyngeal erythema. Eyes:      General:         Right eye: No discharge. Left eye: No discharge. Cardiovascular:      Rate and Rhythm: Normal rate and regular rhythm. Heart sounds: Normal heart sounds. No murmur. Pulmonary:      Effort: Pulmonary effort is normal. No respiratory distress. Breath sounds: Normal breath sounds. No wheezing or rales. Lymphadenopathy:      Cervical: No cervical adenopathy. Skin:     General: Skin is warm. Findings: No rash. Neurological:      Mental Status: She is alert. Pulse 94   Temp 97.5 °F (36.4 °C) (Temporal)   Ht 5' 4\" (1.626 m)   Wt 190 lb (86.2 kg)   SpO2 98%   Breastfeeding No   BMI 32.61 kg/m²     Assessment:       Diagnosis Orders   1. Acute bacterial sinusitis  doxycycline hyclate (VIBRAMYCIN) 100 MG capsule   2. Sinus congestion  COVID-19     Plan:      Based on the duration and severity of the symptoms-- I will treat this as bacterial at this time. Patient instructed to complete antibiotic prescription fully. Will send out COVID19 testing. Possible treatment alterations based on the results.

## 2021-03-10 NOTE — PATIENT INSTRUCTIONS
Patient Education        Sinusitis: Care Instructions  Your Care Instructions     Sinusitis is an infection of the lining of the sinus cavities in your head. Sinusitis often follows a cold. It causes pain and pressure in your head and face. In most cases, sinusitis gets better on its own in 1 to 2 weeks. But some mild symptoms may last for several weeks. Sometimes antibiotics are needed. Follow-up care is a key part of your treatment and safety. Be sure to make and go to all appointments, and call your doctor if you are having problems. It's also a good idea to know your test results and keep a list of the medicines you take. How can you care for yourself at home? · Take an over-the-counter pain medicine, such as acetaminophen (Tylenol), ibuprofen (Advil, Motrin), or naproxen (Aleve). Read and follow all instructions on the label. · If the doctor prescribed antibiotics, take them as directed. Do not stop taking them just because you feel better. You need to take the full course of antibiotics. · Be careful when taking over-the-counter cold or flu medicines and Tylenol at the same time. Many of these medicines have acetaminophen, which is Tylenol. Read the labels to make sure that you are not taking more than the recommended dose. Too much acetaminophen (Tylenol) can be harmful. · Breathe warm, moist air from a steamy shower, a hot bath, or a sink filled with hot water. Avoid cold, dry air. Using a humidifier in your home may help. Follow the directions for cleaning the machine. · Use saline (saltwater) nasal washes to help keep your nasal passages open and wash out mucus and bacteria. You can buy saline nose drops at a grocery store or drugstore. Or you can make your own at home by adding 1 teaspoon of salt and 1 teaspoon of baking soda to 2 cups of distilled water. If you make your own, fill a bulb syringe with the solution, insert the tip into your nostril, and squeeze gently. Saurabh Battles your nose.   · Put a hot, wet towel or a warm gel pack on your face 3 or 4 times a day for 5 to 10 minutes each time. · Try a decongestant nasal spray like oxymetazoline (Afrin). Do not use it for more than 3 days in a row. Using it for more than 3 days can make your congestion worse. When should you call for help? Call your doctor now or seek immediate medical care if:    · You have new or worse swelling or redness in your face or around your eyes.     · You have a new or higher fever. Watch closely for changes in your health, and be sure to contact your doctor if:    · You have new or worse facial pain.     · The mucus from your nose becomes thicker (like pus) or has new blood in it.     · You are not getting better as expected. Where can you learn more? Go to https://Food ReporterpemosheVartopiaeb.Cisco. org and sign in to your [employer] account. Enter Z357 in the [employer] box to learn more about \"Sinusitis: Care Instructions. \"     If you do not have an account, please click on the \"Sign Up Now\" link. Current as of: April 15, 2020               Content Version: 12.6  © 6133-5043 [employer]orporated. Care instructions adapted under license by [facility] ([facility]). If you have questions about a medical condition or this instruction, always ask your healthcare professional. [address] any warranty or liability for your use of this information.

## 2021-03-11 LAB
SARS-COV-2: NORMAL
SARS-COV-2: NOT DETECTED
SOURCE: NORMAL

## 2021-06-17 ENCOUNTER — OFFICE VISIT (OUTPATIENT)
Dept: PRIMARY CARE CLINIC | Age: 41
End: 2021-06-17
Payer: COMMERCIAL

## 2021-06-17 VITALS
HEART RATE: 85 BPM | OXYGEN SATURATION: 95 % | TEMPERATURE: 97.2 F | SYSTOLIC BLOOD PRESSURE: 105 MMHG | DIASTOLIC BLOOD PRESSURE: 72 MMHG

## 2021-06-17 DIAGNOSIS — J20.9 ACUTE BRONCHITIS, UNSPECIFIED ORGANISM: Primary | ICD-10-CM

## 2021-06-17 DIAGNOSIS — R07.89 CHEST TIGHTNESS: ICD-10-CM

## 2021-06-17 DIAGNOSIS — J01.90 ACUTE NON-RECURRENT SINUSITIS, UNSPECIFIED LOCATION: ICD-10-CM

## 2021-06-17 PROCEDURE — 99213 OFFICE O/P EST LOW 20 MIN: CPT | Performed by: NURSE PRACTITIONER

## 2021-06-17 RX ORDER — ALBUTEROL SULFATE 90 UG/1
2 AEROSOL, METERED RESPIRATORY (INHALATION) EVERY 6 HOURS PRN
Qty: 1 INHALER | Refills: 0 | Status: SHIPPED | OUTPATIENT
Start: 2021-06-17

## 2021-06-17 RX ORDER — DOXYCYCLINE HYCLATE 100 MG
100 TABLET ORAL 2 TIMES DAILY
Qty: 20 TABLET | Refills: 0 | Status: SHIPPED | OUTPATIENT
Start: 2021-06-17 | End: 2022-05-25 | Stop reason: ALTCHOICE

## 2021-06-17 ASSESSMENT — ENCOUNTER SYMPTOMS
COUGH: 1
WHEEZING: 0
EYE PAIN: 0
SINUS PRESSURE: 1
SORE THROAT: 0
VOMITING: 0
RHINORRHEA: 0
SHORTNESS OF BREATH: 0
CHEST TIGHTNESS: 1
SINUS PAIN: 1
NAUSEA: 0

## 2021-06-17 NOTE — PROGRESS NOTES
[facility] [address] [facility] [place] [facility]  [address] [place] [address]  Dept: [phone]  Dept Fax: [phone]    Dorla Bosworth is a 38 y.o. female who presents today for her medical conditions/complaints of   Chief Complaint   Patient presents with    Congestion     head congestion, Cough x 4 days; using dayquil/nyquil          HPI:     /72 (Site: Right Upper Arm, Position: Sitting, Cuff Size: Large Adult)   Pulse 85   Temp 97.2 °F (36.2 °C) (Infrared)   SpO2 95%       HPI  Pt presented to the clinic today with c/o congestion. This is a new problem. The current episode started 4 days ago. The problem has been worsening since onset. Associated symptoms include: cough - productive with green/yellow sputum, headache, sinus pressure/pain, chest tightness. Pertinent negatives include: No fever, ear pain, nausea, body aches, SOB, chest pain, abdominal pain . Pt has tried Dayquil/Nyquil with little improvement. No exposure to COVID. No concern for COVID. Vaccinated for COVID x2. No history of asthma. Smoker. Past Medical History:   Diagnosis Date    Diabetes mellitus ([address]     type 2        No past surgical history on file. Family History   Problem Relation Age of Onset    Heart Disease Mother     Other Mother         blood clotting disorder    Heart Disease Father     Other Sister         ASD       Social History     Tobacco Use    Smoking status: Current Some Day Smoker     Packs/day: 0.50     Types: Cigarettes    Smokeless tobacco: Former User    Tobacco comment: 4 per day   Substance Use Topics    Alcohol use: No     Comment: socially        Prior to Visit Medications    Medication Sig Taking?  Authorizing Provider   doxycycline hyclate (VIBRA-TABS) 100 MG tablet Take 1 tablet by mouth 2 times daily Yes CLARA Iyer CNP   albuterol sulfate HFA (PROVENTIL HFA) 108 (90 Base) MCG/ACT inhaler Inhale 2 puffs into the lungs every 6 hours as needed for Decreased air movement present. No rales. Abdominal:      General: Bowel sounds are normal. There is no distension. Palpations: Abdomen is soft. Tenderness: There is no abdominal tenderness. Musculoskeletal:         General: Normal range of motion. Cervical back: Normal range of motion and neck supple. Right lower leg: No edema. Left lower leg: No edema. Skin:     General: Skin is warm and dry. Capillary Refill: Capillary refill takes less than 2 seconds. Findings: No rash. Neurological:      Mental Status: She is alert and oriented to person, place, and time. Coordination: Coordination normal.      Gait: Gait normal.   Psychiatric:         Mood and Affect: Mood normal.         Thought Content: Thought content normal.           MEDICAL DECISION MAKING Assessment/Plan:     King Oswald was seen today for congestion. Diagnoses and all orders for this visit:    Acute bronchitis, unspecified organism  -     doxycycline hyclate (VIBRA-TABS) 100 MG tablet; Take 1 tablet by mouth 2 times daily    Acute non-recurrent sinusitis, unspecified location  -     doxycycline hyclate (VIBRA-TABS) 100 MG tablet; Take 1 tablet by mouth 2 times daily    Chest tightness  -     albuterol sulfate HFA (PROVENTIL HFA) 108 (90 Base) MCG/ACT inhaler; Inhale 2 puffs into the lungs every 6 hours as needed for Wheezing        Results for orders placed or performed during the hospital encounter of 03/10/21   COVID-19    Specimen: Nasopharyngeal Swab   Result Value Ref Range    SARS-CoV-2          Source . NASOPHARYNGEAL SWAB     SARS-CoV-2 Not Detected Not Detected     Based on the patient's history and exam will treat as bronchitis/ sinusititus. The patient does not have clinical findings suggestive of pneumonia.   There is no abnormal vital signs (pulse is not greater than 100/ minute, respirations are not greater than 24/ minute, temperature is not greater than 38 degrees Celsius, or oxygen saturation less than 95 percent) There is no tachypnea, rales, or signs of parenchymal consolidation on exam. There are no changes in mental status or behavioral changes. Pt to fill and take medications as prescribed. Scripts provided for Doxycycline and Albuterol inhaler to take as directed. DC Smoking. Rest, increase fluids. Return if no improvement in symptoms. Go to the ER for any emergent concern. Preventing the Spread of Coronavirus Disease 2019 in Homes and Residential Communities: For the most recent information go to: [employer].    Patient given educational materials - see patientinstructions. Discussed use, benefit, and side effects of prescribed medications. All patient questions answered. Pt verbalized understanding. Instructed to continue current medications, diet and exercise. Patient agreed with treatment plan. Follow up as directed.      Electronically signed by CLARA Lopez CNP on 6/17/2021 at 9:03 AM

## 2021-06-17 NOTE — PATIENT INSTRUCTIONS
good.  When should you call for help? Call 911 anytime you think you may need emergency care. For example, call if:    · You have severe trouble breathing. Call your doctor now or seek immediate medical care if:    · You have new or worse trouble breathing.     · You cough up dark brown or bloody mucus (sputum).     · You have a new or higher fever.     · You have a new rash. Watch closely for changes in your health, and be sure to contact your doctor if:    · You cough more deeply or more often, especially if you notice more mucus or a change in the color of your mucus.     · You are not getting better as expected. Where can you learn more? Go to https://Zaizher.im.ReTel Technologies. org and sign in to your [employer] account. Enter H333 in the [employer] box to learn more about \"Bronchitis: Care Instructions. \"     If you do not have an account, please click on the \"Sign Up Now\" link. Current as of: October 26, 2020               Content Version: 12.9  © 2006-2021 [employer]. Care instructions adapted under license by [facility] [place] ([facility]). If you have questions about a medical condition or this instruction, always ask your healthcare professional. [place] [address] any warranty or liability for your use of this information. Patient Education        Sinusitis: Care Instructions  Your Care Instructions     Sinusitis is an infection of the lining of the sinus cavities in your head. Sinusitis often follows a cold. It causes pain and pressure in your head and face. In most cases, sinusitis gets better on its own in 1 to 2 weeks. But some mild symptoms may last for several weeks. Sometimes antibiotics are needed. Follow-up care is a key part of your treatment and safety. Be sure to make and go to all appointments, and call your doctor if you are having problems. It's also a good idea to know your test results and keep a list of the medicines you take.   How can you care for yourself at home? · Take an over-the-counter pain medicine, such as acetaminophen (Tylenol), ibuprofen (Advil, Motrin), or naproxen (Aleve). Read and follow all instructions on the label. · If the doctor prescribed antibiotics, take them as directed. Do not stop taking them just because you feel better. You need to take the full course of antibiotics. · Be careful when taking over-the-counter cold or flu medicines and Tylenol at the same time. Many of these medicines have acetaminophen, which is Tylenol. Read the labels to make sure that you are not taking more than the recommended dose. Too much acetaminophen (Tylenol) can be harmful. · Breathe warm, moist air from a steamy shower, a hot bath, or a sink filled with hot water. Avoid cold, dry air. Using a humidifier in your home may help. Follow the directions for cleaning the machine. · Use saline (saltwater) nasal washes. This can help keep your nasal passages open and wash out mucus and bacteria. You can buy saline nose drops at a grocery store or drugstore. Or you can make your own at home by adding 1 teaspoon of salt and 1 teaspoon of baking soda to 2 cups of distilled water. If you make your own, fill a bulb syringe with the solution, insert the tip into your nostril, and squeeze gently. Francheska Basque your nose. · Put a hot, wet towel or a warm gel pack on your face 3 or 4 times a day for 5 to 10 minutes each time. · Try a decongestant nasal spray like oxymetazoline (Afrin). Do not use it for more than 3 days in a row. Using it for more than 3 days can make your congestion worse. When should you call for help? Call your doctor now or seek immediate medical care if:    · You have new or worse swelling or redness in your face or around your eyes.     · You have a new or higher fever.    Watch closely for changes in your health, and be sure to contact your doctor if:    · You have new or worse facial pain.     · The mucus from your nose becomes thicker

## 2021-07-14 ENCOUNTER — HOSPITAL ENCOUNTER (OUTPATIENT)
Age: 41
Setting detail: SPECIMEN
Discharge: HOME | End: 2021-07-14
Payer: COMMERCIAL

## 2021-07-14 ENCOUNTER — OFFICE VISIT (OUTPATIENT)
Dept: PRIMARY CARE CLINIC | Age: 41
End: 2021-07-14

## 2021-07-14 VITALS
HEIGHT: 64 IN | HEART RATE: 84 BPM | BODY MASS INDEX: 32.44 KG/M2 | WEIGHT: 190 LBS | OXYGEN SATURATION: 99 % | SYSTOLIC BLOOD PRESSURE: 115 MMHG | DIASTOLIC BLOOD PRESSURE: 76 MMHG

## 2021-07-14 DIAGNOSIS — R39.9 UTI SYMPTOMS: ICD-10-CM

## 2021-07-14 DIAGNOSIS — R39.9 UTI SYMPTOMS: Primary | ICD-10-CM

## 2021-07-14 LAB
BILIRUBIN, POC: NORMAL
BLOOD URINE, POC: NORMAL
CLARITY, POC: CLEAR
COLOR, POC: YELLOW
GLUCOSE URINE, POC: NORMAL
KETONES, POC: NORMAL
LEUKOCYTE EST, POC: NORMAL
NITRITE, POC: NORMAL
PH, POC: 6
PROTEIN, POC: NORMAL
SPECIFIC GRAVITY, POC: 1.01
UROBILINOGEN, POC: 0.2

## 2021-07-14 PROCEDURE — 81002 URINALYSIS NONAUTO W/O SCOPE: CPT | Performed by: NURSE PRACTITIONER

## 2021-07-14 PROCEDURE — 99213 OFFICE O/P EST LOW 20 MIN: CPT | Performed by: NURSE PRACTITIONER

## 2021-07-14 RX ORDER — NITROFURANTOIN 25; 75 MG/1; MG/1
100 CAPSULE ORAL 2 TIMES DAILY
Qty: 10 CAPSULE | Refills: 0 | Status: SHIPPED | OUTPATIENT
Start: 2021-07-14 | End: 2021-07-19

## 2021-07-14 ASSESSMENT — ENCOUNTER SYMPTOMS
ABDOMINAL PAIN: 0
NAUSEA: 0
CONSTIPATION: 0
CHEST TIGHTNESS: 0
SHORTNESS OF BREATH: 0
DIARRHEA: 0
VOMITING: 0
COUGH: 0
WHEEZING: 0
RESPIRATORY NEGATIVE: 1

## 2021-07-14 ASSESSMENT — PATIENT HEALTH QUESTIONNAIRE - PHQ9
SUM OF ALL RESPONSES TO PHQ9 QUESTIONS 1 & 2: 0
SUM OF ALL RESPONSES TO PHQ QUESTIONS 1-9: 0
2. FEELING DOWN, DEPRESSED OR HOPELESS: 0
SUM OF ALL RESPONSES TO PHQ QUESTIONS 1-9: 0
1. LITTLE INTEREST OR PLEASURE IN DOING THINGS: 0
SUM OF ALL RESPONSES TO PHQ QUESTIONS 1-9: 0

## 2021-07-14 NOTE — PATIENT INSTRUCTIONS
Patient Education        Urinary Tract Infection (UTI) in Women: Care Instructions  Overview     A urinary tract infection, or UTI, is a general term for an infection anywhere between the kidneys and the urethra (where urine comes out). Most UTIs are bladder infections. They often cause pain or burning when you urinate. UTIs are caused by bacteria and can be cured with antibiotics. Be sure to complete your treatment so that the infection does not get worse. Follow-up care is a key part of your treatment and safety. Be sure to make and go to all appointments, and call your doctor if you are having problems. It's also a good idea to know your test results and keep a list of the medicines you take. How can you care for yourself at home? · Take your antibiotics as directed. Do not stop taking them just because you feel better. You need to take the full course of antibiotics. · Drink extra water and other fluids for the next day or two. This will help make the urine less concentrated and help wash out the bacteria that are causing the infection. (If you have kidney, heart, or liver disease and have to limit fluids, talk with your doctor before you increase the amount of fluids you drink.)  · Avoid drinks that are carbonated or have caffeine. They can irritate the bladder. · Urinate often. Try to empty your bladder each time. · To relieve pain, take a hot bath or lay a heating pad set on low over your lower belly or genital area. Never go to sleep with a heating pad in place. To prevent UTIs  · Drink plenty of water each day. This helps you urinate often, which clears bacteria from your system. (If you have kidney, heart, or liver disease and have to limit fluids, talk with your doctor before you increase the amount of fluids you drink.)  · Urinate when you need to. · If you are sexually active, urinate right after you have sex. · Change sanitary pads often.   · Avoid douches, bubble baths, feminine hygiene sprays, and other feminine hygiene products that have deodorants. · After going to the bathroom, wipe from front to back. When should you call for help? Call your doctor now or seek immediate medical care if:    · Symptoms such as fever, chills, nausea, or vomiting get worse or appear for the first time.     · You have new pain in your back just below your rib cage. This is called flank pain.     · There is new blood or pus in your urine.     · You have any problems with your antibiotic medicine. Watch closely for changes in your health, and be sure to contact your doctor if:    · You are not getting better after taking an antibiotic for 2 days.     · Your symptoms go away but then come back. Where can you learn more? Go to https://Parallel Universe.Datadecision. org and sign in to your [employer] account. Enter A160 in the [employer] box to learn more about \"Urinary Tract Infection (UTI) in Women: Care Instructions. \"     If you do not have an account, please click on the \"Sign Up Now\" link. Current as of: February 10, 2021               Content Version: 12.9  © 3043-5254 Healthwise, Incorporated. Care instructions adapted under license by [facility] ([facility]). If you have questions about a medical condition or this instruction, always ask your healthcare professional. [address] any warranty or liability for your use of this information.

## 2021-07-14 NOTE — PROGRESS NOTES
[facility] [address] [facility] [place] [facility]  [address] [facility]  [place] [address]  Dept: [phone]  Dept Fax: [phone]     Vinita Simon is a 38 y.o. female who presents to the urgent care today for her medicalconditions/complaints as noted below. Vinita Simon is c/o of Urinary Tract Infection (pt has been having frequency and lower back pain for X 2 days )    HPI:      Urinary Tract Infection   This is a new problem. Episode onset: 2 days ago. The problem has been unchanged. There has been no fever. There is no history of pyelonephritis. Associated symptoms include flank pain, frequency and urgency. Pertinent negatives include no chills, hematuria, nausea or vomiting. She has tried increased fluids for the symptoms. The treatment provided no relief. There is no history of recurrent UTIs. Past Medical History:   Diagnosis Date    Diabetes mellitus (HCC)     type 2           Current Outpatient Medications   Medication Sig Dispense Refill    nitrofurantoin, macrocrystal-monohydrate, (MACROBID) 100 MG capsule Take 1 capsule by mouth 2 times daily for 5 days 10 capsule 0    doxycycline hyclate (VIBRA-TABS) 100 MG tablet Take 1 tablet by mouth 2 times daily 20 tablet 0    albuterol sulfate HFA (PROVENTIL HFA) 108 (90 Base) MCG/ACT inhaler Inhale 2 puffs into the lungs every 6 hours as needed for Wheezing 1 Inhaler 0    metFORMIN (GLUCOPHAGE) 500 MG tablet Take 500 mg by mouth daily       No current facility-administered medications for this visit. Allergies   Allergen Reactions    Penicillins Rash       Subjective:      Review of Systems   Constitutional: Negative for chills, fatigue and fever. Respiratory: Negative. Negative for cough, chest tightness, shortness of breath and wheezing. Cardiovascular: Negative. Negative for chest pain. Gastrointestinal: Negative for abdominal pain, constipation, diarrhea, nausea and vomiting.    Genitourinary: Positive for flank pain, frequency and urgency. Negative for difficulty urinating, dysuria, hematuria, pelvic pain and vaginal discharge. Skin: Negative for rash. All other systems reviewed and are negative. Objective:      Physical Exam  Vitals and nursing note reviewed. Constitutional:       General: She is not in acute distress. Appearance: She is well-developed. She is not diaphoretic. HENT:      Head: Normocephalic and atraumatic. Cardiovascular:      Rate and Rhythm: Normal rate and regular rhythm. Heart sounds: Normal heart sounds. No murmur heard. Pulmonary:      Effort: Pulmonary effort is normal. No respiratory distress. Breath sounds: Normal breath sounds. No wheezing. Abdominal:      General: Abdomen is flat. Bowel sounds are normal. There is no distension. Palpations: Abdomen is soft. Tenderness: There is abdominal tenderness in the suprapubic area. There is right CVA tenderness and left CVA tenderness. Skin:     General: Skin is warm and dry. Neurological:      Mental Status: She is alert. /76 (Site: Left Upper Arm, Position: Sitting, Cuff Size: Large Adult)   Pulse 84   Ht 5' 4\" (1.626 m)   Wt 190 lb (86.2 kg)   SpO2 99%   Breastfeeding No   BMI 32.61 kg/m²     Results for orders placed or performed in visit on 07/14/21   POCT Urinalysis no Micro   Result Value Ref Range    Color, UA yellow     Clarity, UA clear     Glucose, UA POC neg     Bilirubin, UA neg     Ketones, UA neg     Spec Grav, UA 1.015     Blood, UA POC neg     pH, UA 6.0     Protein, UA POC neg     Urobilinogen, UA 0.2     Leukocytes, UA neg     Nitrite, UA neg      Assessment:       Diagnosis Orders   1. UTI symptoms  nitrofurantoin, macrocrystal-monohydrate, (MACROBID) 100 MG capsule    Culture, Urine    POCT Urinalysis no Micro     Plan:      Patient instructed to complete entire antibiotic course. Specimen sent for a culture. Possible treatment alteration based on the results.   Increase fluid intake. Educational materials regarding UTI given on AVS.  Patient agreeable to treatment plan. Follow up if symptoms do not improve/worsen. Orders Placed This Encounter   Medications    nitrofurantoin, macrocrystal-monohydrate, (MACROBID) 100 MG capsule     Sig: Take 1 capsule by mouth 2 times daily for 5 days     Dispense:  10 capsule     Refill:  0        Patient given educational materials - see patient instructions. Discussed use, benefit, and side effects of prescribed medications. All patientquestions answered. Pt voiced understanding.     Electronically signed by Laneta Dandy, APRN - CNP on 7/14/2021at 8:57 AM

## 2021-07-15 LAB
CULTURE: NO GROWTH
Lab: NORMAL
SPECIMEN DESCRIPTION: NORMAL

## 2021-12-29 ENCOUNTER — HOSPITAL ENCOUNTER (OUTPATIENT)
Age: 41
Setting detail: SPECIMEN
Discharge: HOME | End: 2021-12-29

## 2021-12-29 ENCOUNTER — OFFICE VISIT (OUTPATIENT)
Dept: PRIMARY CARE CLINIC | Age: 41
End: 2021-12-29
Payer: COMMERCIAL

## 2021-12-29 VITALS
HEIGHT: 64 IN | OXYGEN SATURATION: 98 % | WEIGHT: 190 LBS | HEART RATE: 89 BPM | SYSTOLIC BLOOD PRESSURE: 117 MMHG | TEMPERATURE: 98.4 F | BODY MASS INDEX: 32.44 KG/M2 | DIASTOLIC BLOOD PRESSURE: 89 MMHG

## 2021-12-29 DIAGNOSIS — R05.9 COUGH: ICD-10-CM

## 2021-12-29 DIAGNOSIS — J01.90 ACUTE BACTERIAL SINUSITIS: Primary | ICD-10-CM

## 2021-12-29 DIAGNOSIS — B96.89 ACUTE BACTERIAL SINUSITIS: Primary | ICD-10-CM

## 2021-12-29 PROCEDURE — 99213 OFFICE O/P EST LOW 20 MIN: CPT | Performed by: NURSE PRACTITIONER

## 2021-12-29 RX ORDER — BENZONATATE 200 MG/1
200 CAPSULE ORAL 3 TIMES DAILY PRN
Qty: 30 CAPSULE | Refills: 0 | Status: SHIPPED | OUTPATIENT
Start: 2021-12-29 | End: 2022-01-05

## 2021-12-29 RX ORDER — AZITHROMYCIN 250 MG/1
TABLET, FILM COATED ORAL
Qty: 1 PACKET | Refills: 0 | Status: SHIPPED | OUTPATIENT
Start: 2021-12-29 | End: 2022-05-25 | Stop reason: ALTCHOICE

## 2021-12-29 RX ORDER — PREDNISONE 20 MG/1
40 TABLET ORAL DAILY
Qty: 10 TABLET | Refills: 0 | Status: SHIPPED | OUTPATIENT
Start: 2021-12-29 | End: 2022-01-03

## 2021-12-29 ASSESSMENT — ENCOUNTER SYMPTOMS
SORE THROAT: 0
WHEEZING: 0
SHORTNESS OF BREATH: 0
CHEST TIGHTNESS: 0
COUGH: 1
SINUS PRESSURE: 0
VOICE CHANGE: 0
EYE DISCHARGE: 0
EYE REDNESS: 0

## 2021-12-29 NOTE — PROGRESS NOTES
[facility] [address] [facility] [place] [facility]  [address] [address]  Dept: [phone]  Dept Fax: [phone]     Eryn Guaman is a 42 y.o. female who presents to the urgent care today for her medicalconditions/complaints as noted below. Eryn Guaman is c/o of Sinusitis (pt has been having cough congestion thick yellow mucus X 3 days )    HPI:      Sinusitis  This is a new problem. Episode onset: 3 days ago. The problem is unchanged. There has been no fever. Associated symptoms include congestion and coughing (productive yellow). Pertinent negatives include no chills, ear pain, headaches, shortness of breath, sinus pressure, sneezing or sore throat. Treatments tried: otc tx. The treatment provided no relief. Past Medical History:   Diagnosis Date    Diabetes mellitus (HCC)     type 2      Current Outpatient Medications   Medication Sig Dispense Refill    predniSONE (DELTASONE) 20 MG tablet Take 2 tablets by mouth daily for 5 days 10 tablet 0    azithromycin (ZITHROMAX Z-SUKHWINDER) 250 MG tablet Take 2 tabs on day 1 followed by 1 tab on days 2-5. 1 packet 0    benzonatate (TESSALON) 200 MG capsule Take 1 capsule by mouth 3 times daily as needed for Cough 30 capsule 0    albuterol sulfate HFA (PROVENTIL HFA) 108 (90 Base) MCG/ACT inhaler Inhale 2 puffs into the lungs every 6 hours as needed for Wheezing 1 Inhaler 0    doxycycline hyclate (VIBRA-TABS) 100 MG tablet Take 1 tablet by mouth 2 times daily (Patient not taking: Reported on 12/29/2021) 20 tablet 0    metFORMIN (GLUCOPHAGE) 500 MG tablet Take 500 mg by mouth daily (Patient not taking: Reported on 12/29/2021)       No current facility-administered medications for this visit. Allergies   Allergen Reactions    Penicillins Rash     Reviewed PMH, SH, and FH with the patient and updated. Subjective:      Review of Systems   Constitutional: Negative for chills, fatigue and fever. HENT: Positive for congestion.  Negative for ear discharge, ear pain, postnasal drip, sinus pressure, sneezing, sore throat and voice change. Eyes: Negative for discharge and redness. Respiratory: Positive for cough (productive yellow). Negative for chest tightness, shortness of breath and wheezing. Cardiovascular: Negative. Negative for chest pain. Musculoskeletal: Negative for myalgias. Skin: Negative for rash. Neurological: Negative for dizziness, weakness, light-headedness and headaches. Hematological: Negative for adenopathy. All other systems reviewed and are negative. Objective:      Physical Exam  Vitals and nursing note reviewed. Constitutional:       General: She is not in acute distress. Appearance: Normal appearance. She is well-developed. She is not ill-appearing, toxic-appearing or diaphoretic. HENT:      Head: Normocephalic. Right Ear: Tympanic membrane and external ear normal.      Left Ear: Tympanic membrane and external ear normal.      Nose: Nose normal.      Right Sinus: No maxillary sinus tenderness or frontal sinus tenderness. Left Sinus: No maxillary sinus tenderness or frontal sinus tenderness. Mouth/Throat:      Pharynx: No oropharyngeal exudate or posterior oropharyngeal erythema. Eyes:      General:         Right eye: No discharge. Left eye: No discharge. Cardiovascular:      Rate and Rhythm: Normal rate and regular rhythm. Heart sounds: Normal heart sounds. No murmur heard. Pulmonary:      Effort: Pulmonary effort is normal. No respiratory distress. Breath sounds: Normal breath sounds. No wheezing or rales. Lymphadenopathy:      Cervical: No cervical adenopathy. Skin:     General: Skin is warm. Findings: No rash. Neurological:      Mental Status: She is alert.        /89 (Site: Left Upper Arm, Position: Sitting, Cuff Size: Large Adult)   Pulse 89   Temp 98.4 °F (36.9 °C) (Tympanic)   Ht 5' 4\" (1.626 m)   Wt 190 lb (86.2 kg)   SpO2 98% Breastfeeding No   BMI 32.61 kg/m²     Assessment:       Diagnosis Orders   1. Acute bacterial sinusitis  predniSONE (DELTASONE) 20 MG tablet    azithromycin (ZITHROMAX Z-SUKHWINDER) 250 MG tablet   2. Cough  COVID-19    benzonatate (TESSALON) 200 MG capsule     Plan:      Based on the severity of the symptoms-- I will treat this as bacterial at this time. Patient instructed to complete antibiotic prescription fully. Prednisone sent to the pharmacy to help enable symptom relief. Tessalon as needed for the cough. Will send out COVID19 testing. Possible treatment alterations based on the results. Patient instructed to self-quarantine until testing results are back. Patient instructed not to return to work until results are back. Tylenol as needed for fever/pain. Encouraged adequate hydration and rest.  The patient indicates understanding of these issues and agrees with the plan. Educational materials provided on AVS.  Follow up if symptoms do not improve/worsen. Discussed symptoms that will warrant urgent ED evaluation/treatment. Orders Placed This Encounter   Medications    predniSONE (DELTASONE) 20 MG tablet     Sig: Take 2 tablets by mouth daily for 5 days     Dispense:  10 tablet     Refill:  0    azithromycin (ZITHROMAX Z-SUKHWINDER) 250 MG tablet     Sig: Take 2 tabs on day 1 followed by 1 tab on days 2-5. Dispense:  1 packet     Refill:  0    benzonatate (TESSALON) 200 MG capsule     Sig: Take 1 capsule by mouth 3 times daily as needed for Cough     Dispense:  30 capsule     Refill:  0        Patient given educational materials - see patient instructions. Discussed use, benefit, and side effects of prescribed medications. All patientquestions answered. Pt voiced understanding.     Electronically signed by CLRAA Ruiz CNP on 12/29/2021at 9:34 AM

## 2021-12-29 NOTE — PATIENT INSTRUCTIONS
Patient Education        Sinusitis: Care Instructions  Your Care Instructions     Sinusitis is an infection of the lining of the sinus cavities in your head. Sinusitis often follows a cold. It causes pain and pressure in your head and face. In most cases, sinusitis gets better on its own in 1 to 2 weeks. But some mild symptoms may last for several weeks. Sometimes antibiotics are needed. Follow-up care is a key part of your treatment and safety. Be sure to make and go to all appointments, and call your doctor if you are having problems. It's also a good idea to know your test results and keep a list of the medicines you take. How can you care for yourself at home? · Take an over-the-counter pain medicine, such as acetaminophen (Tylenol), ibuprofen (Advil, Motrin), or naproxen (Aleve). Read and follow all instructions on the label. · If the doctor prescribed antibiotics, take them as directed. Do not stop taking them just because you feel better. You need to take the full course of antibiotics. · Be careful when taking over-the-counter cold or flu medicines and Tylenol at the same time. Many of these medicines have acetaminophen, which is Tylenol. Read the labels to make sure that you are not taking more than the recommended dose. Too much acetaminophen (Tylenol) can be harmful. · Breathe warm, moist air from a steamy shower, a hot bath, or a sink filled with hot water. Avoid cold, dry air. Using a humidifier in your home may help. Follow the directions for cleaning the machine. · Use saline (saltwater) nasal washes. This can help keep your nasal passages open and wash out mucus and bacteria. You can buy saline nose drops at a grocery store or drugstore. Or you can make your own at home by adding 1 teaspoon of salt and 1 teaspoon of baking soda to 2 cups of distilled water. If you make your own, fill a bulb syringe with the solution, insert the tip into your nostril, and squeeze gently.  Brown Clock your nose.  · Put a hot, wet towel or a warm gel pack on your face 3 or 4 times a day for 5 to 10 minutes each time. · Try a decongestant nasal spray like oxymetazoline (Afrin). Do not use it for more than 3 days in a row. Using it for more than 3 days can make your congestion worse. When should you call for help? Call your doctor now or seek immediate medical care if:    · You have new or worse swelling or redness in your face or around your eyes.     · You have a new or higher fever. Watch closely for changes in your health, and be sure to contact your doctor if:    · You have new or worse facial pain.     · The mucus from your nose becomes thicker (like pus) or has new blood in it.     · You are not getting better as expected. Where can you learn more? Go to https://[employer].Plug.dj. org and sign in to your InMyRoom account. Enter P498 in the Happ[employer] box to learn more about \"Sinusitis: Care Instructions. \"     If you do not have an account, please click on the \"Sign Up Now\" link. Current as of: September 8, 2021               Content Version: 13.1  © 2496-8963 [employer], [employer]. Care instructions adapted under license by [facility] ([facility]). If you have questions about a medical condition or this instruction, always ask your healthcare professional. [place] [address] any warranty or liability for your use of this information.

## 2021-12-30 DIAGNOSIS — R05.9 COUGH: ICD-10-CM

## 2021-12-31 LAB
SARS-COV-2: NORMAL
SARS-COV-2: NOT DETECTED
SOURCE: NORMAL

## 2022-05-25 ENCOUNTER — OFFICE VISIT (OUTPATIENT)
Dept: PRIMARY CARE CLINIC | Age: 42
End: 2022-05-25
Payer: COMMERCIAL

## 2022-05-25 ENCOUNTER — HOSPITAL ENCOUNTER (OUTPATIENT)
Age: 42
Setting detail: SPECIMEN
Discharge: HOME | End: 2022-05-25

## 2022-05-25 VITALS
DIASTOLIC BLOOD PRESSURE: 85 MMHG | TEMPERATURE: 97.2 F | HEART RATE: 81 BPM | OXYGEN SATURATION: 99 % | SYSTOLIC BLOOD PRESSURE: 118 MMHG

## 2022-05-25 DIAGNOSIS — J01.90 ACUTE SINUSITIS, RECURRENCE NOT SPECIFIED, UNSPECIFIED LOCATION: Primary | ICD-10-CM

## 2022-05-25 DIAGNOSIS — Z20.822 CONTACT WITH AND (SUSPECTED) EXPOSURE TO COVID-19: ICD-10-CM

## 2022-05-25 DIAGNOSIS — R07.89 CHEST TIGHTNESS: ICD-10-CM

## 2022-05-25 PROCEDURE — 99213 OFFICE O/P EST LOW 20 MIN: CPT

## 2022-05-25 RX ORDER — DOXYCYCLINE HYCLATE 100 MG
100 TABLET ORAL 2 TIMES DAILY
Qty: 14 TABLET | Refills: 0 | Status: SHIPPED | OUTPATIENT
Start: 2022-05-25 | End: 2022-06-01

## 2022-05-25 RX ORDER — SERTRALINE HYDROCHLORIDE 100 MG/1
TABLET, FILM COATED ORAL
COMMUNITY
Start: 2022-04-29

## 2022-05-25 RX ORDER — ALBUTEROL SULFATE 90 UG/1
1 AEROSOL, METERED RESPIRATORY (INHALATION) 4 TIMES DAILY PRN
Qty: 18 G | Refills: 0 | Status: SHIPPED | OUTPATIENT
Start: 2022-05-25

## 2022-05-25 ASSESSMENT — ENCOUNTER SYMPTOMS
EYE ITCHING: 0
EYE DISCHARGE: 0
TROUBLE SWALLOWING: 0
CHOKING: 0
DIARRHEA: 0
FACIAL SWELLING: 0
CONSTIPATION: 0
ANAL BLEEDING: 0
EYES NEGATIVE: 1
APNEA: 0
HEARTBURN: 0
PHOTOPHOBIA: 0
COUGH: 1
STRIDOR: 0
VOICE CHANGE: 0
ABDOMINAL PAIN: 0
BLOOD IN STOOL: 0
EYE REDNESS: 0
BACK PAIN: 0
EYE PAIN: 0
SORE THROAT: 1
NAUSEA: 0
ABDOMINAL DISTENTION: 0
VOMITING: 0
COLOR CHANGE: 0
CHEST TIGHTNESS: 0
GASTROINTESTINAL NEGATIVE: 1
HEMOPTYSIS: 0
SHORTNESS OF BREATH: 0
SINUS PAIN: 0
RHINORRHEA: 0
WHEEZING: 0
RECTAL PAIN: 0
SINUS PRESSURE: 0

## 2022-05-25 ASSESSMENT — PATIENT HEALTH QUESTIONNAIRE - PHQ9
2. FEELING DOWN, DEPRESSED OR HOPELESS: 0
SUM OF ALL RESPONSES TO PHQ QUESTIONS 1-9: 0
SUM OF ALL RESPONSES TO PHQ QUESTIONS 1-9: 0
SUM OF ALL RESPONSES TO PHQ9 QUESTIONS 1 & 2: 0
SUM OF ALL RESPONSES TO PHQ QUESTIONS 1-9: 0
SUM OF ALL RESPONSES TO PHQ QUESTIONS 1-9: 0
1. LITTLE INTEREST OR PLEASURE IN DOING THINGS: 0

## 2022-05-25 NOTE — PROGRESS NOTES
[address] [place] [facility] [id number] [place] [address] WALK IN CARE  [address] [place] [address]  Dept: [phone]    Maverick Castorena is a 42 y.o. female Established patient, who presents to the walk-in clinic today with conditions/complaints as noted below:    Chief Complaint   Patient presents with    Cough     cough, congestion, sore throat x 2 days; needs covid test for work         HPI:     Cough  This is a new problem. Episode onset: 3 days ago. The problem has been gradually worsening. The problem occurs constantly. The cough is non-productive. Associated symptoms include a fever, headaches, nasal congestion and a sore throat. Pertinent negatives include no chest pain, chills, ear congestion, ear pain, eye redness, heartburn, hemoptysis, myalgias, postnasal drip, rash, rhinorrhea, shortness of breath, sweats, weight loss or wheezing. Nothing aggravates the symptoms. Treatments tried: sudafed, dayquil. The treatment provided no relief.        Past Medical History:   Diagnosis Date    Diabetes mellitus (HCC)     type 2       Current Outpatient Medications   Medication Sig Dispense Refill    sertraline (ZOLOFT) 100 MG tablet       doxycycline hyclate (VIBRA-TABS) 100 MG tablet Take 1 tablet by mouth 2 times daily for 7 days 14 tablet 0    albuterol sulfate HFA (VENTOLIN HFA) 108 (90 Base) MCG/ACT inhaler Inhale 1 puff into the lungs 4 times daily as needed for Wheezing or Shortness of Breath 18 g 0    albuterol sulfate HFA (PROVENTIL HFA) 108 (90 Base) MCG/ACT inhaler Inhale 2 puffs into the lungs every 6 hours as needed for Wheezing (Patient not taking: Reported on 5/25/2022) 1 Inhaler 0    metFORMIN (GLUCOPHAGE) 500 MG tablet Take 500 mg by mouth daily (Patient not taking: Reported on 12/29/2021)       No current facility-administered medications for this visit. Allergies   Allergen Reactions    Penicillins Rash       Review of Systems:     Review of Systems   Constitutional: Positive for fever. Negative for activity change, appetite change, chills, diaphoresis, fatigue, unexpected weight change and weight loss. HENT: Positive for congestion, dental problem and sore throat. Negative for drooling, ear discharge, ear pain, facial swelling, hearing loss, mouth sores, nosebleeds, postnasal drip, rhinorrhea, sinus pressure, sinus pain, sneezing, tinnitus, trouble swallowing and voice change. Eyes: Negative. Negative for photophobia, pain, discharge, redness, itching and visual disturbance. Respiratory: Positive for cough. Negative for apnea, hemoptysis, choking, chest tightness, shortness of breath, wheezing and stridor. Cardiovascular: Negative. Negative for chest pain, palpitations and leg swelling. Gastrointestinal: Negative. Negative for abdominal distention, abdominal pain, anal bleeding, blood in stool, constipation, diarrhea, heartburn, nausea, rectal pain and vomiting. Musculoskeletal: Negative. Negative for arthralgias, back pain, gait problem, joint swelling, myalgias, neck pain and neck stiffness. Skin: Negative. Negative for color change, pallor, rash and wound. Neurological: Positive for headaches. Negative for dizziness, tremors, seizures, syncope, facial asymmetry, speech difficulty, weakness, light-headedness and numbness. Physical Exam:      /85   Pulse 81   Temp 97.2 °F (36.2 °C) (Tympanic)   SpO2 99%     Physical Exam  Vitals reviewed. Constitutional:       Appearance: Normal appearance. She is normal weight. HENT:      Head: Normocephalic and atraumatic. Right Ear: Tympanic membrane, ear canal and external ear normal.      Left Ear: Tympanic membrane, ear canal and external ear normal.      Nose: Congestion present. No rhinorrhea.       Right Sinus: Maxillary sinus tenderness and frontal sinus tenderness present. Left Sinus: Maxillary sinus tenderness and frontal sinus tenderness present. Mouth/Throat:      Lips: Pink. Mouth: Mucous membranes are moist.      Pharynx: Uvula midline. Oropharyngeal exudate (post nasal drainage) and posterior oropharyngeal erythema present. No pharyngeal swelling or uvula swelling. Tonsils: No tonsillar exudate or tonsillar abscesses. Eyes:      Extraocular Movements: Extraocular movements intact. Conjunctiva/sclera: Conjunctivae normal.      Pupils: Pupils are equal, round, and reactive to light. Cardiovascular:      Rate and Rhythm: Normal rate and regular rhythm. Pulses: Normal pulses. Heart sounds: Normal heart sounds. Pulmonary:      Effort: Pulmonary effort is normal.      Breath sounds: Normal air entry. Examination of the right-upper field reveals decreased breath sounds. Examination of the left-upper field reveals decreased breath sounds. Examination of the right-lower field reveals decreased breath sounds. Examination of the left-lower field reveals decreased breath sounds. Decreased breath sounds present. Abdominal:      General: Abdomen is flat. Bowel sounds are normal.      Palpations: Abdomen is soft. Musculoskeletal:         General: Normal range of motion. Cervical back: Normal range of motion and neck supple. Lymphadenopathy:      Cervical: Cervical adenopathy present. Skin:     General: Skin is warm and dry. Capillary Refill: Capillary refill takes less than 2 seconds. Neurological:      General: No focal deficit present. Mental Status: She is alert and oriented to person, place, and time. Mental status is at baseline. Psychiatric:         Mood and Affect: Mood normal.         Behavior: Behavior normal.         Plan:          1. Acute sinusitis, recurrence not specified, unspecified location  -     doxycycline hyclate (VIBRA-TABS) 100 MG tablet;  Take 1 tablet by mouth 2 times daily for 7 days, Disp-14 tablet, R-0Normal  2. Contact with and (suspected) exposure to covid-19  -     COVID-19; Future  3. Chest tightness  -     albuterol sulfate HFA (VENTOLIN HFA) 108 (90 Base) MCG/ACT inhaler; Inhale 1 puff into the lungs 4 times daily as needed for Wheezing or Shortness of Breath, Disp-18 g, R-0Normal    COVID pending. Treatment plans may change pending results. Continue over-the-counter medications as needed for symptoms. Use honey to the back of throat, salt water gargle as needed for sore throat, cough. Go to the ER for shortness of breath, chest pain. Patient verbalized understanding. Follow Up Instructions:      Return if symptoms worsen or fail to improve, for SOB, chest pain go to ER. Orders Placed This Encounter   Medications    doxycycline hyclate (VIBRA-TABS) 100 MG tablet     Sig: Take 1 tablet by mouth 2 times daily for 7 days     Dispense:  14 tablet     Refill:  0    albuterol sulfate HFA (VENTOLIN HFA) 108 (90 Base) MCG/ACT inhaler     Sig: Inhale 1 puff into the lungs 4 times daily as needed for Wheezing or Shortness of Breath     Dispense:  18 g     Refill:  0           Will send out COVID19 testing. Possible treatment alterations based on the results. Patient instructed to self-quarantine until testing results are back. Patient instructed not to return to work until results are back. Tylenol as needed for fever/pain. Encouraged adequate hydration and rest.  The patient indicates understanding of these issues and agrees with the plan. Educational materials provided on AVS.  Follow up if symptoms do not improve/worsen. Discussed symptoms that will warrant urgent ED evaluation/treatment. Patient and/or parent given educational materials - see patient instructions. Discussed use, benefit, and side effects of prescribed medications. All patient questions answered. Patient and/or parent voiced understanding.       Electronically signed by Marychuy Rios [address] [facility], CLARA - Michelle 5/25/2022 at 12:59 PM

## 2022-05-25 NOTE — PATIENT INSTRUCTIONS
Patient Education        Learning About Coronavirus [phone])  What is coronavirus (COVID-19)? COVID-19 is a disease caused by a type of coronavirus. This illness was firstfound in December 2019. It has since spread worldwide. Coronaviruses are a large group of viruses. They cause the common cold. They also cause more serious illnesses like [place] East respiratory syndrome (MERS) and severe acute respiratory syndrome (SARS). COVID-19 is caused by a novelcoronavirus. That means it's a new type that has not been seen in people before. What are the symptoms? COVID-19 symptoms may include:   Fever.  Cough.  Trouble breathing.  Chills or repeated shaking with chills.  Muscle and body aches.  Headache.  Sore throat.  New loss of taste or smell.  Vomiting.  Diarrhea. In severe cases, COVID-19 can cause pneumonia and make it hard to breathewithout help from a machine. It can cause death. How is it diagnosed? COVID-19 is diagnosed with a viral test. This may also be called a PCR test or antigen test. It looks for evidence of the virus in your breathing passages orlungs (respiratory system). The test is most often done on a sample from the nose, throat, or lungs. It's sometimes done on a sample of saliva. One way a sample is collected is byputting a long swab into the back of your nose. If you have questions about COVID-19 testing, ask your doctor or go to cdc.govto use the COVID-19 Viral Testing Tool. How is it treated? Mild cases of COVID-19 can be treated at home. Serious cases need treatment in the hospital. Treatment may include medicines to reduce symptoms, plus breathing support such as oxygen therapy or a ventilator. Some people may beplaced on their belly to help their oxygen levels. Treatments that may help people who have COVID-19 include:  Antiviral medicines. These medicines treat viral infections. Immune-based therapy. These medicines help the immune system fight COVID-19. Examples include monoclonal antibodies. Blood thinners. These medicines help prevent blood clots. People with severe illness are at risk for blood clots. How can you protect yourself and others?  Get vaccinated and boosted.  Avoid sick people and stay away from others if you are sick.  Stay at least 6 feet away from other people.  Avoid crowds, especially inside.  Get tested for COVID-19 before you have an indoor visit with people that don't live with you.  Cover your mouth with a tissue when you cough or sneeze.  Wash your hands often, especially after you cough or sneeze. Use soap and water, and scrub for at least 20 seconds. If soap and water aren't available, use an alcohol-based hand .  Avoid touching your mouth, nose, and eyes. Be sure to follow all instructions from the [facility] and your local health authorities. Here are some examples of specific precautions you may need totake.  If you are not fully vaccinated and boosted:  ? Wear a mask if you have to go to public areas.  Even if you're fully vaccinated and boosted, there's still a chance you can get and spread COVID-19. If you live in an area where COVID-19 is spreading quickly, wear a mask if you have to go to indoor public areas. You might also want to wear a mask in crowded outdoor areas as well as public indoor spaces if you:  ? Have certain health conditions. ? Live with someone who has a compromised immune system. ? Live with someone who is not fully vaccinated.  If you have been exposed to the virus and are not fully vaccinated and boosted:  ? Talk to your doctor as soon as you can. Your doctor might have you take medicine to help prevent serious illness. ? Get a COVID-19 test. You may need to be tested more than once. ? Stay home. Try to separate from other people where you live. Don't go to school, work, or public areas. ? Wear a mask around other people for a full 10 days.  Avoid travel and stay away from people at high risk for serious illness. ? Watch for symptoms.  If you have been exposed and you are fully vaccinated and boosted:  ? Talk to your doctor as soon as you can. Your doctor may have you take medicine to help prevent serious illness. ? Get a COVID-19 test. You may need to be tested more than once. ? Wear a mask around other people for a full 10 days. Avoid travel and stay away from people at high risk for serious illness. ? Watch for symptoms. If you're sick or test positive for COVID-19:   Talk to your doctor as soon as you can. Your doctor may have you take medicine to help prevent serious illness.  Get a COVID-19 test unless you have already been tested. You may need to be tested more than once.  Stay home. Leave only if you need to get medical care.  Wear a mask whenever you're around other people for a full 10 days.  Avoid travel and stay away from people at high risk for serious illness.  Limit contact with pets and people in your home. If possible, stay in a separate bedroom and use a separate bathroom.  Clean and disinfect your home every day. Use household  and disinfectant wipes or sprays. Take special care to clean things that you touch with your hands. If you have questions about COVID-19 testing, ask your doctor or go to cdc.govto use the COVID-19 Viral Testing Tool. How can you self-isolate when you have COVID-19? If you have COVID-19, there are things you can do to help avoid spreading thevirus to others.  Limit contact with people in your home. If possible, stay in a separate bedroom and use a separate bathroom.  Wear a mask when you are around other people.  If you have to leave home, avoid crowds and try to stay at least 6 feet away from other people.  Avoid contact with pets and other animals.  Cover your mouth and nose with a tissue when you cough or sneeze. Then throw it in the trash right away.    Wash your hands often, especially after you cough or sneeze. Use soap and water, and scrub for at least 20 seconds. If soap and water aren't available, use an alcohol-based hand .  Don't share personal household items. These include bedding, towels, cups and glasses, and eating utensils. [address] in the warmest water allowed for the fabric type, and dry it completely. It's okay to wash other people's laundry with yours.  Clean and disinfect your home. Use household  and disinfectant wipes or sprays. When should you call for help? Call 911 anytime you think you may need emergency care. For example, call if you have life-threatening symptoms, such as:     You have severe trouble breathing. (You can't talk at all.)      You have constant chest pain or pressure.      You are severely dizzy or lightheaded.      You are confused or can't think clearly.      You have pale, gray, or blue-colored skin or lips.      You pass out (lose consciousness) or are very hard to wake up.      You have loss of balance or trouble walking.      You have trouble seeing out of one or both eyes.      You have weakness or drooping on one side of the face.      You have weakness or numbness in an arm or a leg.      You have trouble speaking.      You have a severe headache.      You have a seizure. Call your doctor now or seek immediate medical care if:     You have moderate trouble breathing. (You can't speak a full sentence.)      You are coughing up blood.      You have signs of low blood pressure. These include feeling lightheaded; being too weak to stand; and having cold, pale, clammy skin. Watch closely for changes in your health, and be sure to contact your doctor if:     Your symptoms get worse.      You are not getting better as expected.      You have new or worse symptoms of anxiety, depression, nightmares, or flashbacks. Call before you go to the doctor's office. Follow their instructions. And wear a mask.   Where can you learn more?  Go to [email or web address] and sign in to your [employer] account. Enter C008 in the [facility] box to learn more about \"Learning About Coronavirus (COVID-19). \"     If you do not have an account, please click on the \"Sign Up Now\" link. Current as of: July 1, 2021               Content Version: 13.2  © 2006-2022 [employer]. Care instructions adapted under license by [facility] ([facility]). If you have questions about a medical condition or this instruction, always ask your healthcare professional. [place] [address] any warranty or liability for your use of this information. Patient Education        Sinusitis: Care Instructions  Your Care Instructions     Sinusitis is an infection of the lining of the sinus cavities in your head. Sinusitis often follows a cold. It causes pain and pressure in your head andface. In most cases, sinusitis gets better on its own in 1 to 2 weeks. But some mildsymptoms may last for several weeks. Sometimes antibiotics are needed. Follow-up care is a key part of your treatment and safety. Be sure to make and go to all appointments, and call your doctor if you are having problems. It's also a good idea to know your test results and keep alist of the medicines you take. How can you care for yourself at home?  Take an over-the-counter pain medicine, such as acetaminophen (Tylenol), ibuprofen (Advil, Motrin), or naproxen (Aleve). Read and follow all instructions on the label.  If the doctor prescribed antibiotics, take them as directed. Do not stop taking them just because you feel better. You need to take the full course of antibiotics.  Be careful when taking over-the-counter cold or flu medicines and Tylenol at the same time. Many of these medicines have acetaminophen, which is Tylenol. Read the labels to make sure that you are not taking more than the recommended dose.  Too much acetaminophen (Tylenol) can be harmful.  Breathe warm, moist air from a steamy shower, a hot bath, or a sink filled with hot water. Avoid cold, dry air. Using a humidifier in your home may help. Follow the directions for cleaning the machine.  Use saline (saltwater) nasal washes. This can help keep your nasal passages open and wash out mucus and bacteria. You can buy saline nose drops at a grocery store or drugstore. Or you can make your own at home by adding 1 teaspoon of salt and 1 teaspoon of baking soda to 2 cups of distilled water. If you make your own, fill a bulb syringe with the solution, insert the tip into your nostril, and squeeze gently. Eddie Him your nose.  Put a hot, wet towel or a warm gel pack on your face 3 or 4 times a day for 5 to 10 minutes each time.  Try a decongestant nasal spray like oxymetazoline (Afrin). Do not use it for more than 3 days in a row. Using it for more than 3 days can make your congestion worse. When should you call for help? Call your doctor now or seek immediate medical care if:     You have new or worse swelling or redness in your face or around your eyes.      You have a new or higher fever. Watch closely for changes in your health, and be sure to contact your doctor if:     You have new or worse facial pain.      The mucus from your nose becomes thicker (like pus) or has new blood in it.      You are not getting better as expected. Where can you learn more? Go to https://VIOlifemadiha.[employer]. org and sign in to your [employer] account. Enter J559 in the [employer] box to learn more about \"Sinusitis: Care Instructions. \"     If you do not have an account, please click on the \"Sign Up Now\" link. Current as of: September 8, 2021               Content Version: 13.2  © 2006-2022 [employer]. Care instructions adapted under license by [facility] ([facility]).  If you have questions about a medical condition or this instruction, always ask your healthcare professional. [employer] disclaims any warranty or liability for your use of this information.

## 2022-05-26 LAB
SARS-COV-2: NORMAL
SARS-COV-2: NOT DETECTED
SOURCE: NORMAL

## 2022-08-11 ENCOUNTER — HOSPITAL ENCOUNTER (OUTPATIENT)
Age: 42
Setting detail: SPECIMEN
Discharge: HOME | End: 2022-08-11

## 2022-08-11 ENCOUNTER — OFFICE VISIT (OUTPATIENT)
Dept: PRIMARY CARE CLINIC | Age: 42
End: 2022-08-11
Payer: COMMERCIAL

## 2022-08-11 VITALS
HEIGHT: 64 IN | BODY MASS INDEX: 32.44 KG/M2 | WEIGHT: 190 LBS | HEART RATE: 105 BPM | OXYGEN SATURATION: 97 % | DIASTOLIC BLOOD PRESSURE: 88 MMHG | SYSTOLIC BLOOD PRESSURE: 127 MMHG

## 2022-08-11 DIAGNOSIS — R39.9 UTI SYMPTOMS: ICD-10-CM

## 2022-08-11 DIAGNOSIS — R50.9 FEVER, UNSPECIFIED FEVER CAUSE: ICD-10-CM

## 2022-08-11 DIAGNOSIS — B34.9 VIRAL ILLNESS: Primary | ICD-10-CM

## 2022-08-11 LAB
BILIRUBIN, POC: ABNORMAL
BLOOD URINE, POC: ABNORMAL
CLARITY, POC: CLEAR
COLOR, POC: YELLOW
GLUCOSE URINE, POC: ABNORMAL
KETONES, POC: ABNORMAL
LEUKOCYTE EST, POC: ABNORMAL
NITRITE, POC: ABNORMAL
PH, POC: 6.5
PROTEIN, POC: ABNORMAL
SPECIFIC GRAVITY, POC: 1.02
UROBILINOGEN, POC: 0.2

## 2022-08-11 PROCEDURE — 81003 URINALYSIS AUTO W/O SCOPE: CPT | Performed by: NURSE PRACTITIONER

## 2022-08-11 PROCEDURE — 99214 OFFICE O/P EST MOD 30 MIN: CPT | Performed by: NURSE PRACTITIONER

## 2022-08-11 RX ORDER — PREDNISONE 20 MG/1
40 TABLET ORAL DAILY
Qty: 10 TABLET | Refills: 0 | Status: SHIPPED | OUTPATIENT
Start: 2022-08-11 | End: 2022-08-16

## 2022-08-11 ASSESSMENT — ENCOUNTER SYMPTOMS
EYE DISCHARGE: 0
COUGH: 0
EYE REDNESS: 0
SORE THROAT: 1
SHORTNESS OF BREATH: 0
BACK PAIN: 1
CONSTIPATION: 0
WHEEZING: 0
VOMITING: 0
SINUS PRESSURE: 0
DIARRHEA: 0
VOICE CHANGE: 0
ABDOMINAL PAIN: 0
NAUSEA: 0
CHEST TIGHTNESS: 0

## 2022-08-11 NOTE — PROGRESS NOTES
[address] W[facility]ARE  [address] [place] [address]  Dept: [phone]  Dept Fax: [phone]     Alexandra Marie is a 42 y.o. female who presents to the urgent care today for her medicalconditions/complaints as noted below. Alexandra Marie is c/o of Urinary Tract Infection (Pt has been having fever chills not able to pee since this morning)    HPI:      Fever   This is a new problem. The current episode started yesterday. The problem has been unchanged. The maximum temperature noted was 99 to 99.9 F. Associated symptoms include congestion, headaches, muscle aches and a sore throat (mild). Pertinent negatives include no abdominal pain, chest pain, coughing, diarrhea, ear pain, nausea, rash, urinary pain, vomiting or wheezing. She has tried nothing for the symptoms. The treatment provided no relief. Risk factors: no sick contacts      Past Medical History:   Diagnosis Date    Diabetes mellitus ([facility] [address]     type 2      Current Outpatient Medications   Medication Sig Dispense Refill    predniSONE (DELTASONE) 20 MG tablet Take 2 tablets by mouth in the morning for 5 days. 10 tablet 0    sertraline (ZOLOFT) 100 MG tablet       albuterol sulfate HFA (VENTOLIN HFA) 108 (90 Base) MCG/ACT inhaler Inhale 1 puff into the lungs 4 times daily as needed for Wheezing or Shortness of Breath 18 g 0    albuterol sulfate HFA (PROVENTIL HFA) 108 (90 Base) MCG/ACT inhaler Inhale 2 puffs into the lungs every 6 hours as needed for Wheezing (Patient not taking: Reported on 5/25/2022) 1 Inhaler 0    metFORMIN (GLUCOPHAGE) 500 MG tablet Take 500 mg by mouth daily (Patient not taking: Reported on 12/29/2021)       No current facility-administered medications for this visit. Allergies   Allergen Reactions    Penicillins Rash     Reviewed PMH, SH, and FH with the patient and updated. Subjective:      Review of Systems   Constitutional:  Positive for fever. Negative for fatigue. HENT:  Positive for congestion and sore throat (mild). Negative for ear discharge, ear pain, postnasal drip, sinus pressure, sneezing and voice change. Eyes:  Negative for discharge and redness. Respiratory:  Negative for cough, chest tightness, shortness of breath and wheezing. Cardiovascular: Negative. Negative for chest pain. Gastrointestinal:  Negative for abdominal pain, constipation, diarrhea, nausea and vomiting. Genitourinary:  Positive for decreased urine volume. Negative for dysuria, frequency, hematuria, pelvic pain and urgency. Musculoskeletal:  Positive for back pain and myalgias. Skin:  Negative for rash. Neurological:  Positive for headaches. Negative for dizziness, weakness and light-headedness. Hematological:  Negative for adenopathy. All other systems reviewed and are negative. Objective:      Physical Exam  Vitals and nursing note reviewed. Constitutional:       General: She is not in acute distress. Appearance: Normal appearance. She is well-developed. She is not ill-appearing, toxic-appearing or diaphoretic. HENT:      Head: Normocephalic. Right Ear: Tympanic membrane and external ear normal.      Left Ear: Tympanic membrane and external ear normal.      Nose: Nose normal.      Right Sinus: No maxillary sinus tenderness or frontal sinus tenderness. Left Sinus: No maxillary sinus tenderness or frontal sinus tenderness. Mouth/Throat:      Pharynx: No oropharyngeal exudate or posterior oropharyngeal erythema. Eyes:      General:         Right eye: No discharge. Left eye: No discharge. Cardiovascular:      Rate and Rhythm: Normal rate and regular rhythm. Heart sounds: Normal heart sounds. No murmur heard. Pulmonary:      Effort: Pulmonary effort is normal. No respiratory distress. Breath sounds: Normal breath sounds. No wheezing or rales. Lymphadenopathy:      Cervical: No cervical adenopathy.    Skin: General: Skin is warm. Findings: No rash. Neurological:      Mental Status: She is alert. /88 (Site: Left Upper Arm, Position: Sitting, Cuff Size: Large Adult)   Pulse (!) 105   Ht 5' 4\" (1.626 m)   Wt 190 lb (86.2 kg)   SpO2 97%   BMI 32.61 kg/m²     Results for orders placed or performed in visit on 08/11/22   POCT Urinalysis No Micro (Auto)   Result Value Ref Range    Color, UA yellow     Clarity, UA clear     Glucose, UA POC neg     Bilirubin, UA neg     Ketones, UA neg     Spec Grav, UA 1.020     Blood, UA POC moderate     pH, UA 6.5     Protein, UA POC neg     Urobilinogen, UA 0.2     Leukocytes, UA neg     Nitrite, UA neg      Assessment:       Diagnosis Orders   1. Viral illness  predniSONE (DELTASONE) 20 MG tablet      2. UTI symptoms  POCT Urinalysis No Micro (Auto)    Culture, Urine      3. Fever, unspecified fever cause  COVID-19        Plan: Will send out COVID19 testing. Possible treatment alterations based on the results. Prednisone sent to the pharmacy to help enable symptom relief. Patient instructed to self-quarantine until testing results are back. Patient instructed not to return to work until results are back. Tylenol as needed for fever/pain. Encouraged adequate hydration and rest.  The patient indicates understanding of these issues and agrees with the plan. Educational materials provided on AVS.  Follow up if symptoms do not improve/worsen. Discussed symptoms that will warrant urgent ED evaluation/treatment. Orders Placed This Encounter   Medications    predniSONE (DELTASONE) 20 MG tablet     Sig: Take 2 tablets by mouth in the morning for 5 days. Dispense:  10 tablet     Refill:  0          Patient given educational materials - see patient instructions. Discussed use, benefit, and side effects of prescribed medications. All patientquestions answered. Pt voiced understanding.     Electronically signed by CLARA Price CNP on 8/11/2022at 7:32 PM

## 2022-08-12 LAB
CULTURE: NORMAL
SARS-COV-2: NORMAL
SARS-COV-2: NOT DETECTED
SOURCE: NORMAL
SPECIMEN DESCRIPTION: NORMAL

## 2022-10-23 ENCOUNTER — HOSPITAL ENCOUNTER (EMERGENCY)
Age: 42
Discharge: LEFT BEFORE DISCHARGE | End: 2022-10-23

## 2022-10-23 VITALS
BODY MASS INDEX: 32.44 KG/M2 | SYSTOLIC BLOOD PRESSURE: 130 MMHG | OXYGEN SATURATION: 95 % | HEIGHT: 64 IN | RESPIRATION RATE: 16 BRPM | DIASTOLIC BLOOD PRESSURE: 87 MMHG | WEIGHT: 190 LBS | HEART RATE: 92 BPM | TEMPERATURE: 97.6 F

## 2022-10-23 ASSESSMENT — PAIN - FUNCTIONAL ASSESSMENT: PAIN_FUNCTIONAL_ASSESSMENT: 0-10

## 2022-10-23 ASSESSMENT — PAIN SCALES - GENERAL: PAINLEVEL_OUTOF10: 5

## 2022-10-24 ENCOUNTER — OFFICE VISIT (OUTPATIENT)
Dept: PRIMARY CARE CLINIC | Age: 42
End: 2022-10-24
Payer: COMMERCIAL

## 2022-10-24 ENCOUNTER — HOSPITAL ENCOUNTER (OUTPATIENT)
Age: 42
Setting detail: SPECIMEN
Discharge: HOME | End: 2022-10-24

## 2022-10-24 VITALS
OXYGEN SATURATION: 98 % | SYSTOLIC BLOOD PRESSURE: 122 MMHG | HEART RATE: 89 BPM | DIASTOLIC BLOOD PRESSURE: 85 MMHG | TEMPERATURE: 97.8 F

## 2022-10-24 DIAGNOSIS — T30.0 BURN: Primary | ICD-10-CM

## 2022-10-24 DIAGNOSIS — Z20.822 EXPOSURE TO COVID-19 VIRUS: ICD-10-CM

## 2022-10-24 PROCEDURE — 99213 OFFICE O/P EST LOW 20 MIN: CPT | Performed by: NURSE PRACTITIONER

## 2022-10-24 ASSESSMENT — ENCOUNTER SYMPTOMS
SHORTNESS OF BREATH: 0
WHEEZING: 0
RESPIRATORY NEGATIVE: 1
BURN: 1
CHEST TIGHTNESS: 0
COUGH: 0
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
## 2022-11-28 ENCOUNTER — OFFICE VISIT (OUTPATIENT)
Dept: PRIMARY CARE CLINIC | Age: 42
End: 2022-11-28

## 2022-11-28 DIAGNOSIS — R69 SICK: Primary | ICD-10-CM

## 2022-11-28 PROCEDURE — 99999 PR OFFICE/OUTPT VISIT,PROCEDURE ONLY: CPT | Performed by: FAMILY MEDICINE

## 2023-11-29 ENCOUNTER — OFFICE VISIT (OUTPATIENT)
Dept: PRIMARY CARE CLINIC | Age: 43
End: 2023-11-29
Payer: COMMERCIAL

## 2023-11-29 VITALS
BODY MASS INDEX: 37.56 KG/M2 | HEART RATE: 80 BPM | OXYGEN SATURATION: 96 % | TEMPERATURE: 97.4 F | HEIGHT: 64 IN | SYSTOLIC BLOOD PRESSURE: 124 MMHG | WEIGHT: 220 LBS | DIASTOLIC BLOOD PRESSURE: 85 MMHG

## 2023-11-29 DIAGNOSIS — J98.8 RESPIRATORY INFECTION: Primary | ICD-10-CM

## 2023-11-29 PROCEDURE — G8484 FLU IMMUNIZE NO ADMIN: HCPCS | Performed by: FAMILY MEDICINE

## 2023-11-29 PROCEDURE — G8427 DOCREV CUR MEDS BY ELIG CLIN: HCPCS | Performed by: FAMILY MEDICINE

## 2023-11-29 PROCEDURE — G8417 CALC BMI ABV UP PARAM F/U: HCPCS | Performed by: FAMILY MEDICINE

## 2023-11-29 PROCEDURE — 4004F PT TOBACCO SCREEN RCVD TLK: CPT | Performed by: FAMILY MEDICINE

## 2023-11-29 PROCEDURE — 99213 OFFICE O/P EST LOW 20 MIN: CPT | Performed by: FAMILY MEDICINE

## 2023-11-29 RX ORDER — PREDNISONE 20 MG/1
40 TABLET ORAL DAILY
Qty: 10 TABLET | Refills: 0 | Status: SHIPPED | OUTPATIENT
Start: 2023-11-29 | End: 2023-12-04

## 2023-11-29 RX ORDER — OMEPRAZOLE 40 MG/1
CAPSULE, DELAYED RELEASE ORAL
COMMUNITY
Start: 2023-10-11

## 2023-11-29 RX ORDER — DOXYCYCLINE HYCLATE 100 MG
100 TABLET ORAL 2 TIMES DAILY
Qty: 14 TABLET | Refills: 0 | Status: SHIPPED | OUTPATIENT
Start: 2023-11-29 | End: 2023-12-06

## 2023-11-29 ASSESSMENT — ENCOUNTER SYMPTOMS
SINUS PRESSURE: 1
SHORTNESS OF BREATH: 1
HOARSE VOICE: 1
COUGH: 1
SINUS COMPLAINT: 1

## 2023-11-29 NOTE — PROGRESS NOTES
[address] [facility] [place] [address] [address]  [address] [place] [address]  Dept: [phone]  Dept Fax: [phone]    Kaden Beard is a 39 y.o. female who presents today for her medical conditions/complaintsas noted below. Kaden Beard is c/o of Congestion (Cough, chest congestion, nasal congestion x 1.5weeks; no relief with OTC medications )        HPI:     Sinus Problem  This is a new problem. The current episode started 1 to 4 weeks ago (1.5 weeks). The problem has been gradually worsening since onset. There has been no fever. She is experiencing no pain. Associated symptoms include chills, congestion, coughing, a hoarse voice, shortness of breath (after coughing episode) and sinus pressure. Pertinent negatives include no ear pain. (Throat feels restricted) Treatments tried: dayquil, nyquil. The treatment provided no relief. Past Medical History:   Diagnosis Date    Diabetes mellitus ([address])     type 2    Past medical history reviewed and pertinent positives/negatives in the HPI    History reviewed. No pertinent surgical history.     Family History   Problem Relation Age of Onset    Heart Disease Mother     Other Mother         blood clotting disorder    Heart Disease Father     Other Sister         ASD       Social History     Tobacco Use    Smoking status: Some Days     Packs/day: .5     Types: Cigarettes    Smokeless tobacco: Former    Tobacco comments:     4 per day   Substance Use Topics    Alcohol use: No     Comment: socially      Current Outpatient Medications   Medication Sig Dispense Refill    omeprazole (PRILOSEC) 40 MG delayed release capsule TAKE 1 CAPSULE(40 MG) BY MOUTH IN THE MORNING      predniSONE (DELTASONE) 20 MG tablet Take 2 tablets by mouth daily for 5 days 10 tablet 0    doxycycline hyclate (VIBRA-TABS) 100 MG tablet Take 1 tablet by mouth 2 times daily for 7 days 14 tablet 0    silver sulfADIAZINE (SILVADENE) 1 % cream

## 2024-03-14 ENCOUNTER — OFFICE VISIT (OUTPATIENT)
Dept: PRIMARY CARE CLINIC | Age: 44
End: 2024-03-14

## 2024-03-14 VITALS
DIASTOLIC BLOOD PRESSURE: 73 MMHG | OXYGEN SATURATION: 97 % | SYSTOLIC BLOOD PRESSURE: 114 MMHG | TEMPERATURE: 97.7 F | HEART RATE: 85 BPM

## 2024-03-14 DIAGNOSIS — L23.9 ALLERGIC DERMATITIS: Primary | ICD-10-CM

## 2024-03-14 RX ORDER — SEMAGLUTIDE 0.25 MG/.5ML
0.25 INJECTION, SOLUTION SUBCUTANEOUS
COMMUNITY
Start: 2023-12-08

## 2024-03-14 RX ORDER — CYANOCOBALAMIN 1000 UG/ML
1000 INJECTION, SOLUTION INTRAMUSCULAR; SUBCUTANEOUS ONCE
COMMUNITY

## 2024-03-14 RX ORDER — TRIAMCINOLONE ACETONIDE 40 MG/ML
40 INJECTION, SUSPENSION INTRA-ARTICULAR; INTRAMUSCULAR ONCE
Status: COMPLETED | OUTPATIENT
Start: 2024-03-14 | End: 2024-03-14

## 2024-03-14 RX ORDER — LORAZEPAM 0.5 MG/1
TABLET ORAL
COMMUNITY
Start: 2024-03-01

## 2024-03-14 RX ORDER — PREDNISONE 20 MG/1
40 TABLET ORAL DAILY
Qty: 10 TABLET | Refills: 0 | Status: SHIPPED | OUTPATIENT
Start: 2024-03-14 | End: 2024-03-19

## 2024-03-14 RX ADMIN — TRIAMCINOLONE ACETONIDE 40 MG: 40 INJECTION, SUSPENSION INTRA-ARTICULAR; INTRAMUSCULAR at 09:39

## 2024-03-14 ASSESSMENT — ENCOUNTER SYMPTOMS
COUGH: 0
FACIAL SWELLING: 1
WHEEZING: 0
SHORTNESS OF BREATH: 0
CHEST TIGHTNESS: 0
RESPIRATORY NEGATIVE: 1

## 2024-03-14 NOTE — PROGRESS NOTES
(KENALOG-40) injection 40 mg    predniSONE (DELTASONE) 20 MG tablet        Plan:      Based on the reported symptoms and the appearance of the rash-- I believe this is allergic dermatitis at this time.  Kenalog injection administered in the office. Patient tolerated well.  Prednisone sent to the pharmacy to help enable symptom relief. Medication to start tomorrow.  Claritin daily. Benadryl nightly as needed.  Patient agrees with treatment plan.  Educational materials provided on AVS.  Follow up if symptoms do not improve/worsen.    Orders Placed This Encounter   Medications    triamcinolone acetonide (KENALOG-40) injection 40 mg    predniSONE (DELTASONE) 20 MG tablet     Sig: Take 2 tablets by mouth daily for 5 days     Dispense:  10 tablet     Refill:  0        Patient given educational materials - see patient instructions.Discussed use, benefit, and side effects of prescribed medications.  All patientquestions answered.  Pt voiced understanding.    Electronically signed by CLARA Turpin CNP on 3/14/2024at 9:34 AM

## 2024-08-23 ENCOUNTER — OFFICE VISIT (OUTPATIENT)
Dept: PRIMARY CARE CLINIC | Age: 44
End: 2024-08-23
Payer: COMMERCIAL

## 2024-08-23 VITALS
TEMPERATURE: 98 F | DIASTOLIC BLOOD PRESSURE: 78 MMHG | SYSTOLIC BLOOD PRESSURE: 112 MMHG | OXYGEN SATURATION: 96 % | HEART RATE: 84 BPM

## 2024-08-23 DIAGNOSIS — S05.02XA ABRASION OF LEFT CORNEA, INITIAL ENCOUNTER: Primary | ICD-10-CM

## 2024-08-23 PROCEDURE — G8417 CALC BMI ABV UP PARAM F/U: HCPCS | Performed by: NURSE PRACTITIONER

## 2024-08-23 PROCEDURE — 99213 OFFICE O/P EST LOW 20 MIN: CPT | Performed by: NURSE PRACTITIONER

## 2024-08-23 PROCEDURE — G8427 DOCREV CUR MEDS BY ELIG CLIN: HCPCS | Performed by: NURSE PRACTITIONER

## 2024-08-23 PROCEDURE — 4004F PT TOBACCO SCREEN RCVD TLK: CPT | Performed by: NURSE PRACTITIONER

## 2024-08-23 RX ORDER — OFLOXACIN 3 MG/ML
1 SOLUTION/ DROPS OPHTHALMIC 4 TIMES DAILY
Qty: 5 ML | Refills: 0 | Status: SHIPPED | OUTPATIENT
Start: 2024-08-23 | End: 2024-09-02

## 2024-08-23 ASSESSMENT — ENCOUNTER SYMPTOMS
PHOTOPHOBIA: 0
EYE PAIN: 1
DOUBLE VISION: 0
VOMITING: 0
EYE REDNESS: 1
COUGH: 0
NAUSEA: 0
WHEEZING: 0
FOREIGN BODY SENSATION: 1
RESPIRATORY NEGATIVE: 1
SHORTNESS OF BREATH: 0
BLURRED VISION: 0
EYE DISCHARGE: 1
CHEST TIGHTNESS: 0
EYE ITCHING: 0

## 2024-08-23 NOTE — PROGRESS NOTES
puff into the lungs 4 times daily as needed for Wheezing or Shortness of Breath (Patient not taking: Reported on 10/23/2022) 18 g 0    albuterol sulfate HFA (PROVENTIL HFA) 108 (90 Base) MCG/ACT inhaler Inhale 2 puffs into the lungs every 6 hours as needed for Wheezing (Patient not taking: Reported on 5/25/2022) 1 Inhaler 0    metFORMIN (GLUCOPHAGE) 500 MG tablet Take 500 mg by mouth daily (Patient not taking: Reported on 12/29/2021)       No current facility-administered medications for this visit.     Allergies   Allergen Reactions    Penicillins Rash       Subjective:      Review of Systems   Constitutional:  Negative for chills, fatigue and fever.   Eyes:  Positive for pain (left), discharge (watery) and redness (left). Negative for blurred vision, double vision, photophobia and itching.   Respiratory: Negative.  Negative for cough, chest tightness, shortness of breath and wheezing.    Cardiovascular: Negative.  Negative for chest pain.   Gastrointestinal:  Negative for nausea and vomiting.   Skin:  Negative for rash.   All other systems reviewed and are negative.    Objective:      Physical Exam  Vitals and nursing note reviewed.   Constitutional:       General: She is not in acute distress.     Appearance: She is well-developed. She is not diaphoretic.   HENT:      Head: Normocephalic and atraumatic.   Eyes:      General:         Left eye: Discharge (watery) present.     Conjunctiva/sclera:      Left eye: Left conjunctiva is injected.      Pupils:      Left eye: Corneal abrasion (at 3 o'clock) present.   Cardiovascular:      Rate and Rhythm: Normal rate and regular rhythm.      Heart sounds: Normal heart sounds. No murmur heard.  Pulmonary:      Effort: Pulmonary effort is normal. No respiratory distress.      Breath sounds: Normal breath sounds. No wheezing.   Skin:     General: Skin is warm and dry.   Neurological:      Mental Status: She is alert.       /78   Pulse 84   Temp 98 °F (36.7 °C) (Oral)

## 2024-08-29 ENCOUNTER — OFFICE VISIT (OUTPATIENT)
Dept: PRIMARY CARE CLINIC | Age: 44
End: 2024-08-29
Payer: COMMERCIAL

## 2024-08-29 VITALS
SYSTOLIC BLOOD PRESSURE: 107 MMHG | HEART RATE: 86 BPM | DIASTOLIC BLOOD PRESSURE: 77 MMHG | TEMPERATURE: 97.5 F | OXYGEN SATURATION: 96 %

## 2024-08-29 DIAGNOSIS — J01.00 ACUTE NON-RECURRENT MAXILLARY SINUSITIS: Primary | ICD-10-CM

## 2024-08-29 PROCEDURE — G8417 CALC BMI ABV UP PARAM F/U: HCPCS

## 2024-08-29 PROCEDURE — G8427 DOCREV CUR MEDS BY ELIG CLIN: HCPCS

## 2024-08-29 PROCEDURE — 4004F PT TOBACCO SCREEN RCVD TLK: CPT

## 2024-08-29 PROCEDURE — 99213 OFFICE O/P EST LOW 20 MIN: CPT

## 2024-08-29 RX ORDER — DOXYCYCLINE HYCLATE 100 MG
100 TABLET ORAL 2 TIMES DAILY
Qty: 14 TABLET | Refills: 0 | Status: SHIPPED | OUTPATIENT
Start: 2024-08-29 | End: 2024-09-05

## 2024-08-29 ASSESSMENT — ENCOUNTER SYMPTOMS
RHINORRHEA: 0
ANAL BLEEDING: 0
BLOOD IN STOOL: 0
TROUBLE SWALLOWING: 0
VOMITING: 0
ABDOMINAL PAIN: 0
CONSTIPATION: 0
RECTAL PAIN: 0
VISUAL CHANGE: 0
SHORTNESS OF BREATH: 0
COLOR CHANGE: 0
ABDOMINAL DISTENTION: 0
EYE REDNESS: 0
APNEA: 0
CHOKING: 0
EYES NEGATIVE: 1
PHOTOPHOBIA: 0
NAUSEA: 0
FACIAL SWELLING: 0
WHEEZING: 0
EYE DISCHARGE: 0
EYE PAIN: 0
CHEST TIGHTNESS: 0
SINUS PAIN: 0
BACK PAIN: 0
SWOLLEN GLANDS: 1
GASTROINTESTINAL NEGATIVE: 1
SINUS PRESSURE: 1
DIARRHEA: 0
EYE ITCHING: 0
VOICE CHANGE: 0
RESPIRATORY NEGATIVE: 1
COUGH: 0
STRIDOR: 0
CHANGE IN BOWEL HABIT: 0
SORE THROAT: 1

## 2024-08-29 NOTE — PROGRESS NOTES
[facility], [facility]  [address]  [place] [address]    [facility]  [address]  [facility] [address]  Dept: [phone]    Kati Bell is a 44 y.o. female Established patient, who presents to the walk-in clinic today with conditions/complaints as noted below:    Chief Complaint   Patient presents with    Adenopathy     Swollen glands, left sided sinus pressure x 1 day          HPI:     Pharyngitis  This is a new problem. The current episode started yesterday. The problem occurs constantly. The problem has been gradually worsening. Associated symptoms include a sore throat and swollen glands. Pertinent negatives include no abdominal pain, anorexia, arthralgias, change in bowel habit, chest pain, chills, congestion, coughing, diaphoresis, fatigue, fever, headaches, joint swelling, myalgias, nausea, neck pain, numbness, rash, urinary symptoms, vertigo, visual change, vomiting or weakness. She has tried acetaminophen and NSAIDs for the symptoms. The treatment provided no relief.       Past Medical History:   Diagnosis Date    Diabetes mellitus (HCC)     type 2       Current Outpatient Medications   Medication Sig Dispense Refill    doxycycline hyclate (VIBRA-TABS) 100 MG tablet Take 1 tablet by mouth 2 times daily for 7 days 14 tablet 0    ofloxacin (OCUFLOX) 0.3 % solution Place 1 drop into the left eye 4 times daily for 10 days 5 mL 0    omeprazole (PRILOSEC) 40 MG delayed release capsule TAKE 1 CAPSULE(40 MG) BY MOUTH IN THE MORNING      Semaglutide-Weight Management (WEGOVY) 0.25 MG/0.5ML SOAJ SC injection Inject 0.25 mg into the skin every 7 days (Patient not taking: Reported on 8/29/2024)      LORazepam (ATIVAN) 0.5 MG tablet TAKE 1 TABLET(0.5 MG) BY MOUTH EVERY 8 HOURS AS NEEDED FOR ANXIETY (Patient not taking: Reported on 8/23/2024)       arthralgias, back pain, gait problem, joint swelling, myalgias, neck pain and neck stiffness.   Skin: Negative.  Negative for color change, pallor, rash and wound.   Neurological: Negative.  Negative for dizziness, vertigo, tremors, seizures, syncope, facial asymmetry, speech difficulty, weakness, light-headedness, numbness and headaches.   Hematological:  Positive for adenopathy. Does not bruise/bleed easily.       Physical Exam:      /77   Pulse 86   Temp 97.5 °F (36.4 °C) (Tympanic)   LMP 08/10/2022   SpO2 96%     Physical Exam  Vitals reviewed.   Constitutional:       Appearance: Normal appearance. She is normal weight.   HENT:      Head: Normocephalic and atraumatic.      Right Ear: Tympanic membrane, ear canal and external ear normal.      Left Ear: Tympanic membrane, ear canal and external ear normal.      Nose:      Right Sinus: No maxillary sinus tenderness or frontal sinus tenderness.      Left Sinus: Maxillary sinus tenderness and frontal sinus tenderness present.      Mouth/Throat:      Lips: Pink.      Mouth: Mucous membranes are moist.      Pharynx: Oropharynx is clear. Uvula midline.   Eyes:      Conjunctiva/sclera: Conjunctivae normal.      Pupils: Pupils are equal, round, and reactive to light.   Cardiovascular:      Rate and Rhythm: Normal rate and regular rhythm.      Pulses: Normal pulses.      Heart sounds: Normal heart sounds.   Pulmonary:      Effort: Pulmonary effort is normal.      Breath sounds: Normal breath sounds and air entry.   Musculoskeletal:      Cervical back: Normal range of motion and neck supple.   Lymphadenopathy:      Cervical: Cervical adenopathy present.      Left cervical: Superficial cervical adenopathy present.   Skin:     General: Skin is warm and dry.      Capillary Refill: Capillary refill takes less than 2 seconds.   Neurological:      General: No focal deficit present.      Mental Status: She is alert and oriented to person, place, and time. Mental status

## 2025-08-03 ENCOUNTER — OFFICE VISIT (OUTPATIENT)
Dept: PRIMARY CARE CLINIC | Age: 45
End: 2025-08-03
Payer: COMMERCIAL

## 2025-08-03 VITALS
BODY MASS INDEX: 37.56 KG/M2 | WEIGHT: 220 LBS | DIASTOLIC BLOOD PRESSURE: 83 MMHG | HEART RATE: 83 BPM | HEIGHT: 64 IN | OXYGEN SATURATION: 96 % | SYSTOLIC BLOOD PRESSURE: 115 MMHG

## 2025-08-03 DIAGNOSIS — S05.01XA ABRASION OF RIGHT CORNEA, INITIAL ENCOUNTER: Primary | ICD-10-CM

## 2025-08-03 PROCEDURE — 99213 OFFICE O/P EST LOW 20 MIN: CPT

## 2025-08-03 RX ORDER — POLYMYXIN B SULFATE AND TRIMETHOPRIM 1; 10000 MG/ML; [USP'U]/ML
1 SOLUTION OPHTHALMIC EVERY 4 HOURS
Qty: 1 EACH | Refills: 0 | Status: SHIPPED | OUTPATIENT
Start: 2025-08-03 | End: 2025-08-10

## 2025-08-03 SDOH — ECONOMIC STABILITY: FOOD INSECURITY: WITHIN THE PAST 12 MONTHS, YOU WORRIED THAT YOUR FOOD WOULD RUN OUT BEFORE YOU GOT MONEY TO BUY MORE.: NEVER TRUE

## 2025-08-03 SDOH — ECONOMIC STABILITY: FOOD INSECURITY: WITHIN THE PAST 12 MONTHS, THE FOOD YOU BOUGHT JUST DIDN'T LAST AND YOU DIDN'T HAVE MONEY TO GET MORE.: NEVER TRUE

## 2025-08-03 ASSESSMENT — PATIENT HEALTH QUESTIONNAIRE - PHQ9
2. FEELING DOWN, DEPRESSED OR HOPELESS: NOT AT ALL
1. LITTLE INTEREST OR PLEASURE IN DOING THINGS: NOT AT ALL
SUM OF ALL RESPONSES TO PHQ QUESTIONS 1-9: 0